# Patient Record
Sex: MALE | Race: BLACK OR AFRICAN AMERICAN | Employment: OTHER | URBAN - METROPOLITAN AREA
[De-identification: names, ages, dates, MRNs, and addresses within clinical notes are randomized per-mention and may not be internally consistent; named-entity substitution may affect disease eponyms.]

---

## 2020-12-12 ENCOUNTER — APPOINTMENT (OUTPATIENT)
Dept: CT IMAGING | Age: 65
DRG: 641 | End: 2020-12-12
Payer: COMMERCIAL

## 2020-12-12 ENCOUNTER — HOSPITAL ENCOUNTER (INPATIENT)
Age: 65
LOS: 1 days | Discharge: HOME | DRG: 641 | End: 2020-12-14
Attending: EMERGENCY MEDICINE | Admitting: INTERNAL MEDICINE
Payer: COMMERCIAL

## 2020-12-12 ENCOUNTER — APPOINTMENT (OUTPATIENT)
Dept: GENERAL RADIOLOGY | Age: 65
DRG: 641 | End: 2020-12-12
Payer: COMMERCIAL

## 2020-12-12 PROBLEM — E86.0 DEHYDRATION: Status: ACTIVE | Noted: 2020-12-12

## 2020-12-12 LAB
ABSOLUTE EOS #: 0 K/UL (ref 0–0.4)
ABSOLUTE IMMATURE GRANULOCYTE: ABNORMAL K/UL (ref 0–0.3)
ABSOLUTE LYMPH #: 0.6 K/UL (ref 1–4.8)
ABSOLUTE MONO #: 1.3 K/UL (ref 0.1–1.2)
ALBUMIN SERPL-MCNC: 4.8 G/DL (ref 3.5–5.2)
ALBUMIN/GLOBULIN RATIO: 1.5 (ref 1–2.5)
ALP BLD-CCNC: 45 U/L (ref 40–129)
ALT SERPL-CCNC: 39 U/L (ref 5–41)
AMYLASE: 33 U/L (ref 28–100)
ANION GAP SERPL CALCULATED.3IONS-SCNC: 29 MMOL/L (ref 9–17)
AST SERPL-CCNC: 99 U/L
BASOPHILS # BLD: 1 % (ref 0–2)
BASOPHILS ABSOLUTE: 0.1 K/UL (ref 0–0.2)
BILIRUB SERPL-MCNC: 0.68 MG/DL (ref 0.3–1.2)
BILIRUBIN DIRECT: 0.28 MG/DL
BILIRUBIN URINE: NEGATIVE
BILIRUBIN, INDIRECT: 0.4 MG/DL (ref 0–1)
BNP INTERPRETATION: ABNORMAL
BUN BLDV-MCNC: 21 MG/DL (ref 8–23)
BUN/CREAT BLD: ABNORMAL (ref 9–20)
CALCIUM SERPL-MCNC: 9.6 MG/DL (ref 8.6–10.4)
CHLORIDE BLD-SCNC: 96 MMOL/L (ref 98–107)
CO2: 19 MMOL/L (ref 20–31)
COLOR: YELLOW
COMMENT UA: ABNORMAL
CREAT SERPL-MCNC: 1.54 MG/DL (ref 0.7–1.2)
D-DIMER QUANTITATIVE: 5.14 MG/L FEU
DIFFERENTIAL TYPE: ABNORMAL
EOSINOPHILS RELATIVE PERCENT: 0 % (ref 1–4)
GFR AFRICAN AMERICAN: 55 ML/MIN
GFR NON-AFRICAN AMERICAN: 46 ML/MIN
GFR SERPL CREATININE-BSD FRML MDRD: ABNORMAL ML/MIN/{1.73_M2}
GFR SERPL CREATININE-BSD FRML MDRD: ABNORMAL ML/MIN/{1.73_M2}
GLOBULIN: ABNORMAL G/DL (ref 1.5–3.8)
GLUCOSE BLD-MCNC: 85 MG/DL (ref 70–99)
GLUCOSE URINE: NEGATIVE
HCT VFR BLD CALC: 37.9 % (ref 41–53)
HEMOGLOBIN: 12.6 G/DL (ref 13.5–17.5)
IMMATURE GRANULOCYTES: ABNORMAL %
INR BLD: 0.9
KETONES, URINE: ABNORMAL
LACTIC ACID, SEPSIS WHOLE BLOOD: ABNORMAL MMOL/L (ref 0.5–1.9)
LACTIC ACID, SEPSIS: 8.5 MMOL/L (ref 0.5–1.9)
LACTIC ACID: 9.2 MMOL/L (ref 0.5–2.2)
LEUKOCYTE ESTERASE, URINE: NEGATIVE
LIPASE: 13 U/L (ref 13–60)
LYMPHOCYTES # BLD: 7 % (ref 24–44)
MCH RBC QN AUTO: 36.7 PG (ref 26–34)
MCHC RBC AUTO-ENTMCNC: 33.4 G/DL (ref 31–37)
MCV RBC AUTO: 109.9 FL (ref 80–100)
MONOCYTES # BLD: 13 % (ref 2–11)
NITRITE, URINE: NEGATIVE
NRBC AUTOMATED: ABNORMAL PER 100 WBC
PARTIAL THROMBOPLASTIN TIME: <20 SEC (ref 21.3–31.3)
PDW BLD-RTO: 14.2 % (ref 12.5–15.4)
PH UA: 5.5 (ref 5–8)
PLATELET # BLD: 248 K/UL (ref 140–450)
PLATELET ESTIMATE: ABNORMAL
PMV BLD AUTO: 7.9 FL (ref 6–12)
POTASSIUM SERPL-SCNC: 4.5 MMOL/L (ref 3.7–5.3)
PRO-BNP: 485 PG/ML
PROTEIN UA: NEGATIVE
PROTHROMBIN TIME: 9.1 SEC (ref 9.4–12.6)
RBC # BLD: 3.45 M/UL (ref 4.5–5.9)
RBC # BLD: ABNORMAL 10*6/UL
SARS-COV-2, RAPID: NOT DETECTED
SARS-COV-2: NORMAL
SARS-COV-2: NORMAL
SEG NEUTROPHILS: 79 % (ref 36–66)
SEGMENTED NEUTROPHILS ABSOLUTE COUNT: 7.5 K/UL (ref 1.8–7.7)
SODIUM BLD-SCNC: 144 MMOL/L (ref 135–144)
SOURCE: NORMAL
SPECIFIC GRAVITY UA: 1.03 (ref 1–1.03)
TOTAL PROTEIN: 8.1 G/DL (ref 6.4–8.3)
TROPONIN INTERP: NORMAL
TROPONIN T: NORMAL NG/ML
TROPONIN, HIGH SENSITIVITY: 22 NG/L (ref 0–22)
TURBIDITY: CLEAR
URINE HGB: NEGATIVE
UROBILINOGEN, URINE: NORMAL
WBC # BLD: 9.5 K/UL (ref 3.5–11)
WBC # BLD: ABNORMAL 10*3/UL

## 2020-12-12 PROCEDURE — 6360000002 HC RX W HCPCS: Performed by: NURSE PRACTITIONER

## 2020-12-12 PROCEDURE — 80076 HEPATIC FUNCTION PANEL: CPT

## 2020-12-12 PROCEDURE — 85025 COMPLETE CBC W/AUTO DIFF WBC: CPT

## 2020-12-12 PROCEDURE — 80048 BASIC METABOLIC PNL TOTAL CA: CPT

## 2020-12-12 PROCEDURE — 2580000003 HC RX 258: Performed by: NURSE PRACTITIONER

## 2020-12-12 PROCEDURE — 81003 URINALYSIS AUTO W/O SCOPE: CPT

## 2020-12-12 PROCEDURE — 93005 ELECTROCARDIOGRAM TRACING: CPT | Performed by: EMERGENCY MEDICINE

## 2020-12-12 PROCEDURE — 99285 EMERGENCY DEPT VISIT HI MDM: CPT

## 2020-12-12 PROCEDURE — 85379 FIBRIN DEGRADATION QUANT: CPT

## 2020-12-12 PROCEDURE — G0378 HOSPITAL OBSERVATION PER HR: HCPCS

## 2020-12-12 PROCEDURE — 83690 ASSAY OF LIPASE: CPT

## 2020-12-12 PROCEDURE — 2580000003 HC RX 258: Performed by: EMERGENCY MEDICINE

## 2020-12-12 PROCEDURE — 82150 ASSAY OF AMYLASE: CPT

## 2020-12-12 PROCEDURE — 96372 THER/PROPH/DIAG INJ SC/IM: CPT

## 2020-12-12 PROCEDURE — 96375 TX/PRO/DX INJ NEW DRUG ADDON: CPT

## 2020-12-12 PROCEDURE — 6360000002 HC RX W HCPCS: Performed by: EMERGENCY MEDICINE

## 2020-12-12 PROCEDURE — 84484 ASSAY OF TROPONIN QUANT: CPT

## 2020-12-12 PROCEDURE — U0003 INFECTIOUS AGENT DETECTION BY NUCLEIC ACID (DNA OR RNA); SEVERE ACUTE RESPIRATORY SYNDROME CORONAVIRUS 2 (SARS-COV-2) (CORONAVIRUS DISEASE [COVID-19]), AMPLIFIED PROBE TECHNIQUE, MAKING USE OF HIGH THROUGHPUT TECHNOLOGIES AS DESCRIBED BY CMS-2020-01-R: HCPCS

## 2020-12-12 PROCEDURE — 96374 THER/PROPH/DIAG INJ IV PUSH: CPT

## 2020-12-12 PROCEDURE — 6370000000 HC RX 637 (ALT 250 FOR IP): Performed by: NURSE PRACTITIONER

## 2020-12-12 PROCEDURE — 85610 PROTHROMBIN TIME: CPT

## 2020-12-12 PROCEDURE — 6360000004 HC RX CONTRAST MEDICATION: Performed by: EMERGENCY MEDICINE

## 2020-12-12 PROCEDURE — 83605 ASSAY OF LACTIC ACID: CPT

## 2020-12-12 PROCEDURE — 83880 ASSAY OF NATRIURETIC PEPTIDE: CPT

## 2020-12-12 PROCEDURE — U0002 COVID-19 LAB TEST NON-CDC: HCPCS

## 2020-12-12 PROCEDURE — 71045 X-RAY EXAM CHEST 1 VIEW: CPT

## 2020-12-12 PROCEDURE — 71260 CT THORAX DX C+: CPT

## 2020-12-12 PROCEDURE — 85730 THROMBOPLASTIN TIME PARTIAL: CPT

## 2020-12-12 PROCEDURE — 96361 HYDRATE IV INFUSION ADD-ON: CPT

## 2020-12-12 PROCEDURE — 36415 COLL VENOUS BLD VENIPUNCTURE: CPT

## 2020-12-12 PROCEDURE — 74176 CT ABD & PELVIS W/O CONTRAST: CPT

## 2020-12-12 RX ORDER — LORAZEPAM 2 MG/ML
1 INJECTION INTRAMUSCULAR
Status: DISCONTINUED | OUTPATIENT
Start: 2020-12-12 | End: 2020-12-14 | Stop reason: HOSPADM

## 2020-12-12 RX ORDER — FOLIC ACID 1 MG/1
1 TABLET ORAL DAILY
Status: DISCONTINUED | OUTPATIENT
Start: 2020-12-12 | End: 2020-12-14 | Stop reason: HOSPADM

## 2020-12-12 RX ORDER — LORAZEPAM 1 MG/1
4 TABLET ORAL
Status: DISCONTINUED | OUTPATIENT
Start: 2020-12-12 | End: 2020-12-14 | Stop reason: HOSPADM

## 2020-12-12 RX ORDER — SODIUM CHLORIDE 0.9 % (FLUSH) 0.9 %
10 SYRINGE (ML) INJECTION PRN
Status: DISCONTINUED | OUTPATIENT
Start: 2020-12-12 | End: 2020-12-14 | Stop reason: HOSPADM

## 2020-12-12 RX ORDER — SODIUM CHLORIDE, SODIUM LACTATE, POTASSIUM CHLORIDE, AND CALCIUM CHLORIDE .6; .31; .03; .02 G/100ML; G/100ML; G/100ML; G/100ML
1000 INJECTION, SOLUTION INTRAVENOUS ONCE
Status: COMPLETED | OUTPATIENT
Start: 2020-12-12 | End: 2020-12-12

## 2020-12-12 RX ORDER — NICOTINE 21 MG/24HR
1 PATCH, TRANSDERMAL 24 HOURS TRANSDERMAL DAILY PRN
Status: DISCONTINUED | OUTPATIENT
Start: 2020-12-12 | End: 2020-12-14 | Stop reason: HOSPADM

## 2020-12-12 RX ORDER — ASPIRIN 81 MG/1
81 TABLET, CHEWABLE ORAL DAILY
Status: DISCONTINUED | OUTPATIENT
Start: 2020-12-12 | End: 2020-12-14 | Stop reason: HOSPADM

## 2020-12-12 RX ORDER — SODIUM CHLORIDE 0.9 % (FLUSH) 0.9 %
10 SYRINGE (ML) INJECTION EVERY 12 HOURS SCHEDULED
Status: DISCONTINUED | OUTPATIENT
Start: 2020-12-12 | End: 2020-12-14 | Stop reason: HOSPADM

## 2020-12-12 RX ORDER — POTASSIUM CHLORIDE 7.45 MG/ML
10 INJECTION INTRAVENOUS PRN
Status: DISCONTINUED | OUTPATIENT
Start: 2020-12-12 | End: 2020-12-14 | Stop reason: HOSPADM

## 2020-12-12 RX ORDER — PROMETHAZINE HYDROCHLORIDE 25 MG/1
12.5 TABLET ORAL EVERY 6 HOURS PRN
Status: DISCONTINUED | OUTPATIENT
Start: 2020-12-12 | End: 2020-12-14 | Stop reason: HOSPADM

## 2020-12-12 RX ORDER — 0.9 % SODIUM CHLORIDE 0.9 %
1000 INTRAVENOUS SOLUTION INTRAVENOUS ONCE
Status: COMPLETED | OUTPATIENT
Start: 2020-12-12 | End: 2020-12-12

## 2020-12-12 RX ORDER — 0.9 % SODIUM CHLORIDE 0.9 %
80 INTRAVENOUS SOLUTION INTRAVENOUS ONCE
Status: COMPLETED | OUTPATIENT
Start: 2020-12-12 | End: 2020-12-12

## 2020-12-12 RX ORDER — ONDANSETRON 2 MG/ML
4 INJECTION INTRAMUSCULAR; INTRAVENOUS ONCE
Status: COMPLETED | OUTPATIENT
Start: 2020-12-12 | End: 2020-12-12

## 2020-12-12 RX ORDER — LORAZEPAM 1 MG/1
1 TABLET ORAL
Status: DISCONTINUED | OUTPATIENT
Start: 2020-12-12 | End: 2020-12-14 | Stop reason: HOSPADM

## 2020-12-12 RX ORDER — ONDANSETRON 2 MG/ML
4 INJECTION INTRAMUSCULAR; INTRAVENOUS EVERY 6 HOURS PRN
Status: DISCONTINUED | OUTPATIENT
Start: 2020-12-12 | End: 2020-12-14 | Stop reason: HOSPADM

## 2020-12-12 RX ORDER — POTASSIUM CHLORIDE 20 MEQ/1
40 TABLET, EXTENDED RELEASE ORAL PRN
Status: DISCONTINUED | OUTPATIENT
Start: 2020-12-12 | End: 2020-12-14 | Stop reason: HOSPADM

## 2020-12-12 RX ORDER — HEPARIN SODIUM 5000 [USP'U]/ML
5000 INJECTION, SOLUTION INTRAVENOUS; SUBCUTANEOUS EVERY 8 HOURS SCHEDULED
Status: DISCONTINUED | OUTPATIENT
Start: 2020-12-12 | End: 2020-12-14 | Stop reason: HOSPADM

## 2020-12-12 RX ORDER — LORAZEPAM 1 MG/1
2 TABLET ORAL
Status: DISCONTINUED | OUTPATIENT
Start: 2020-12-12 | End: 2020-12-14 | Stop reason: HOSPADM

## 2020-12-12 RX ORDER — LORAZEPAM 2 MG/ML
3 INJECTION INTRAMUSCULAR
Status: DISCONTINUED | OUTPATIENT
Start: 2020-12-12 | End: 2020-12-14 | Stop reason: HOSPADM

## 2020-12-12 RX ORDER — ASPIRIN 81 MG/1
81 TABLET, CHEWABLE ORAL DAILY
Status: ON HOLD | COMMUNITY
End: 2020-12-14 | Stop reason: HOSPADM

## 2020-12-12 RX ORDER — MAGNESIUM SULFATE 1 G/100ML
1 INJECTION INTRAVENOUS PRN
Status: DISCONTINUED | OUTPATIENT
Start: 2020-12-12 | End: 2020-12-14 | Stop reason: HOSPADM

## 2020-12-12 RX ORDER — POLYETHYLENE GLYCOL 3350 17 G/17G
17 POWDER, FOR SOLUTION ORAL DAILY PRN
Status: DISCONTINUED | OUTPATIENT
Start: 2020-12-12 | End: 2020-12-13

## 2020-12-12 RX ORDER — ACETAMINOPHEN 650 MG/1
650 SUPPOSITORY RECTAL EVERY 6 HOURS PRN
Status: DISCONTINUED | OUTPATIENT
Start: 2020-12-12 | End: 2020-12-14 | Stop reason: HOSPADM

## 2020-12-12 RX ORDER — OMEPRAZOLE 40 MG/1
40 CAPSULE, DELAYED RELEASE ORAL DAILY
COMMUNITY

## 2020-12-12 RX ORDER — LORAZEPAM 1 MG/1
3 TABLET ORAL
Status: DISCONTINUED | OUTPATIENT
Start: 2020-12-12 | End: 2020-12-14 | Stop reason: HOSPADM

## 2020-12-12 RX ORDER — ACETAMINOPHEN 325 MG/1
650 TABLET ORAL EVERY 6 HOURS PRN
Status: DISCONTINUED | OUTPATIENT
Start: 2020-12-12 | End: 2020-12-14 | Stop reason: HOSPADM

## 2020-12-12 RX ORDER — PANTOPRAZOLE SODIUM 40 MG/1
40 TABLET, DELAYED RELEASE ORAL
Status: DISCONTINUED | OUTPATIENT
Start: 2020-12-13 | End: 2020-12-13

## 2020-12-12 RX ORDER — SODIUM CHLORIDE 0.9 % (FLUSH) 0.9 %
10 SYRINGE (ML) INJECTION PRN
Status: DISCONTINUED | OUTPATIENT
Start: 2020-12-12 | End: 2020-12-12

## 2020-12-12 RX ORDER — SODIUM CHLORIDE 9 MG/ML
INJECTION, SOLUTION INTRAVENOUS CONTINUOUS
Status: DISCONTINUED | OUTPATIENT
Start: 2020-12-12 | End: 2020-12-14

## 2020-12-12 RX ORDER — LORAZEPAM 2 MG/ML
4 INJECTION INTRAMUSCULAR
Status: DISCONTINUED | OUTPATIENT
Start: 2020-12-12 | End: 2020-12-14 | Stop reason: HOSPADM

## 2020-12-12 RX ORDER — THIAMINE MONONITRATE (VIT B1) 100 MG
100 TABLET ORAL DAILY
Status: DISCONTINUED | OUTPATIENT
Start: 2020-12-12 | End: 2020-12-14 | Stop reason: HOSPADM

## 2020-12-12 RX ORDER — LORAZEPAM 2 MG/ML
2 INJECTION INTRAMUSCULAR
Status: DISCONTINUED | OUTPATIENT
Start: 2020-12-12 | End: 2020-12-14 | Stop reason: HOSPADM

## 2020-12-12 RX ADMIN — HEPARIN SODIUM 5000 UNITS: 5000 INJECTION INTRAVENOUS; SUBCUTANEOUS at 21:11

## 2020-12-12 RX ADMIN — SODIUM CHLORIDE 80 ML: 9 INJECTION, SOLUTION INTRAVENOUS at 16:21

## 2020-12-12 RX ADMIN — SODIUM CHLORIDE 1000 ML: 9 INJECTION, SOLUTION INTRAVENOUS at 14:41

## 2020-12-12 RX ADMIN — Medication 100 MG: at 21:02

## 2020-12-12 RX ADMIN — Medication 10 ML: at 16:20

## 2020-12-12 RX ADMIN — FOLIC ACID 1 MG: 1 TABLET ORAL at 21:02

## 2020-12-12 RX ADMIN — SODIUM CHLORIDE, POTASSIUM CHLORIDE, SODIUM LACTATE AND CALCIUM CHLORIDE 1000 ML: 600; 310; 30; 20 INJECTION, SOLUTION INTRAVENOUS at 16:12

## 2020-12-12 RX ADMIN — ASPIRIN 81 MG: 81 TABLET, CHEWABLE ORAL at 21:05

## 2020-12-12 RX ADMIN — ONDANSETRON 4 MG: 2 INJECTION INTRAMUSCULAR; INTRAVENOUS at 16:11

## 2020-12-12 RX ADMIN — SODIUM CHLORIDE: 9 INJECTION, SOLUTION INTRAVENOUS at 18:45

## 2020-12-12 RX ADMIN — SODIUM CHLORIDE, POTASSIUM CHLORIDE, SODIUM LACTATE AND CALCIUM CHLORIDE 1000 ML: 600; 310; 30; 20 INJECTION, SOLUTION INTRAVENOUS at 15:46

## 2020-12-12 RX ADMIN — IOPAMIDOL 75 ML: 755 INJECTION, SOLUTION INTRAVENOUS at 16:20

## 2020-12-12 NOTE — ED NOTES
Lab in room to collect repeat lactic acid. Attempt to call report.  Room assignment given, RN unavailable for report will call back when ready     Tamar Lara RN  12/12/20 0223

## 2020-12-12 NOTE — ED PROVIDER NOTES
OMEPRAZOLE (PRILOSEC) 40 MG DELAYED RELEASE CAPSULE    Take 40 mg by mouth daily       ALLERGIES     has No Known Allergies. FAMILY HISTORY     has no family status information on file. family history is not on file. SOCIAL HISTORY      reports that he has never smoked. He has never used smokeless tobacco. He reports current alcohol use. He reports that he does not use drugs. PHYSICAL EXAM     INITIAL VITALS:  height is 5' 10.5\" (1.791 m) and weight is 69.4 kg (153 lb). His oral temperature is 98.2 °F (36.8 °C). His blood pressure is 149/68 (abnormal) and his pulse is 91. His respiration is 18 and oxygen saturation is 97%. The patient is alert and oriented, in no apparent distress. HEENT is atraumatic. Pupils are PERRL at 4 mm with normal extraocular motion. Mucous membranes moist.    Neck is supple with no lymphadenopathy. No JVD. No meningismus. Heart sounds irregular rate and rhythm with no gallops, murmurs, or rubs. Lungs clear, no wheezes, rales or rhonchi. Abdomen: soft, with no pain to palpation. .  No pulsatile mass. Normal bowel sounds are noted. No rebound or guarding. Musculoskeletal exam shows no evidence of trauma. Normal distal pulses in all extremities. Skin: no rash or edema. Neurological exam reveals cranial nerves 2 through 12 grossly intact. Patient has equal  and normal deep tendon reflexes. Psychiatric: appropriate. Lymphatics.:  No lymphadenopathy. DIFFERENTIAL DIAGNOSIS/ MDM:     Vomiting, gastritis, pancreatitis, arrhythmia, AMI, ACS    DIAGNOSTIC RESULTS     EKG: All EKG's are interpreted by the Emergency Department Physician who either signs or Co-signs this chart in the absence of a cardiologist.    As tach 119 with frequent couplets of PVCs. No old to compare. Nonspecific ST change. Axis -55, , QRS 64, .     RADIOLOGY:   I reviewed the radiologist interpretations:  CT CHEST PULMONARY EMBOLISM W CONTRAST Final Result   2 small bilateral pulmonary nodules as above. This is not typical for   COVID-19. This does not exclude a COVID infection. RECOMMENDATIONS:   Multiple pulmonary nodules. Most severe: 4.0 mm ground glass pulmonary   nodule. Recommend a non-contrast Chest CT at 3-6 months. If stable, consider   follow-up non-contrast Chest CTs at 2 and 4 years. These guidelines do not apply to immunocompromised patients and patients with   cancer. Follow up in patients with significant comorbidities as clinically   warranted. For lung cancer screening, adhere to Lung-RADS guidelines. Reference: Radiology. 2017; 284(1):228-43. XR CHEST PORTABLE   Final Result   No acute process. CT ABDOMEN PELVIS WO CONTRAST Additional Contrast? None   Final Result   Fatty liver. Small hiatal hernia. CT CHEST PULMONARY EMBOLISM W CONTRAST (Final result)  Result time 12/12/20 16:40:38  Final result by Adelia Collins MD (12/12/20 16:40:38)                Impression:    2 small bilateral pulmonary nodules as above.  This is not typical for   COVID-19.  This does not exclude a COVID infection. RECOMMENDATIONS:   Multiple pulmonary nodules. Most severe: 4.0 mm ground glass pulmonary   nodule. Recommend a non-contrast Chest CT at 3-6 months. If stable, consider   follow-up non-contrast Chest CTs at 2 and 4 years. These guidelines do not apply to immunocompromised patients and patients with   cancer. Follow up in patients with significant comorbidities as clinically   warranted. For lung cancer screening, adhere to Lung-RADS guidelines. Reference: Radiology. 2017; 284(1):228-43. Narrative:    EXAMINATION:   CTA OF THE CHEST 12/12/2020 4:10 pm     TECHNIQUE:   CTA of the chest was performed after the administration of intravenous   contrast.  Multiplanar reformatted images are provided for review.   MIP images are provided for review. Dose modulation, iterative reconstruction,   and/or weight based adjustment of the mA/kV was utilized to reduce the   radiation dose to as low as reasonably achievable. COMPARISON:   Chest x-ray from today     HISTORY:   ORDERING SYSTEM PROVIDED HISTORY: Chest Pain   TECHNOLOGIST PROVIDED HISTORY:   Chest Pain   Reason for Exam: heartburn vomiting looking for covid   Acuity: Acute   Type of Exam: Initial     FINDINGS:   Pulmonary Arteries: Pulmonary arteries are adequately opacified for   evaluation.  No evidence of intraluminal filling defect to suggest pulmonary   embolism.  Main pulmonary artery is normal in caliber. Mediastinum: No evidence of mediastinal lymphadenopathy.  The heart and   pericardium demonstrate no acute abnormality.  There is no acute abnormality   of the thoracic aorta. Lungs/pleura: 4 mm ground-glass nodule left lower lobe image number 70.  3 mm   ground-glass nodule right lower lobe image number 90. Upper Abdomen: Limited images of the upper abdomen are unremarkable. Soft Tissues/Bones: Mild dextroconvex scoliosis.         XR CHEST PORTABLE (Final result)  Result time 12/12/20 15:51:17  Final result by Imani Pepper MD (12/12/20 15:51:17)                Impression:    No acute process. Narrative:    EXAMINATION:   ONE XRAY VIEW OF THE CHEST     12/12/2020 3:41 pm     COMPARISON:   January 23, 2013     HISTORY:   ORDERING SYSTEM PROVIDED HISTORY: vomiting   TECHNOLOGIST PROVIDED HISTORY:   vomiting   Reason for Exam: heartburn vomitrng   Acuity: Acute   Type of Exam: Initial     FINDINGS:   The lungs are without acute focal process.  There is no effusion or   pneumothorax. The cardiomediastinal silhouette is without acute process.  The   osseous structures are without acute process.                     CT ABDOMEN PELVIS WO CONTRAST Additional Contrast? None (Final result)  Result time 12/12/20 15:53:04 Procedure changed from [facility]  Final result by Chantel Orr MD (12/12/20 15:53:04)                Impression:    Fatty liver. Small hiatal hernia. Narrative:    EXAMINATION:   CT OF THE ABDOMEN AND PELVIS WITHOUT CONTRAST 12/12/2020 3:30 pm     TECHNIQUE:   CT of the abdomen and pelvis was performed without the administration of   intravenous contrast. Multiplanar reformatted images are provided for review. Dose modulation, iterative reconstruction, and/or weight based adjustment of   the mA/kV was utilized to reduce the radiation dose to as low as reasonably   achievable. COMPARISON:   None. HISTORY:   ORDERING SYSTEM PROVIDED HISTORY: Pain   TECHNOLOGIST PROVIDED HISTORY:   IV Only Contrast   Pain     Reason for Exam: mid abd pain heartburn vomiting   Acuity: Acute   Type of Exam: Initial     FINDINGS:   Lower Chest: Unremarkable     Organs: Fatty liver.  Gallbladder, pancreas, and spleen are unremarkable. GI/Bowel: Small hiatal hernia.  Appendix is normal.  No findings to suggest   bowel obstruction.  Scattered colonic diverticula.  No acute diverticulitis. Pelvis: No bladder stone.  No pelvic fluid collection.  No adenopathy. Peritoneum/Retroperitoneum: No abdominal aortic aneurysm.  Adrenal glands   unremarkable. Paulo Fillers are unremarkable.  No renal or ureteral stone.      Bones/Soft Tissues: No acute bony abnormality.                     LABS:  Results for orders placed or performed during the hospital encounter of 12/12/20   CBC Auto Differential   Result Value Ref Range    WBC 9.5 3.5 - 11.0 k/uL    RBC 3.45 (L) 4.5 - 5.9 m/uL    Hemoglobin 12.6 (L) 13.5 - 17.5 g/dL    Hematocrit 37.9 (L) 41 - 53 %    .9 (H) 80 - 100 fL    MCH 36.7 (H) 26 - 34 pg    MCHC 33.4 31 - 37 g/dL    RDW 14.2 12.5 - 15.4 %    Platelets 729 104 - 927 k/uL    MPV 7.9 6.0 - 12.0 fL    NRBC Automated NOT REPORTED per 100 WBC    Differential Type NOT REPORTED Seg Neutrophils 79 (H) 36 - 66 %    Lymphocytes 7 (L) 24 - 44 %    Monocytes 13 (H) 2 - 11 %    Eosinophils % 0 (L) 1 - 4 %    Basophils 1 0 - 2 %    Immature Granulocytes NOT REPORTED 0 %    Segs Absolute 7.50 1.8 - 7.7 k/uL    Absolute Lymph # 0.60 (L) 1.0 - 4.8 k/uL    Absolute Mono # 1.30 (H) 0.1 - 1.2 k/uL    Absolute Eos # 0.00 0.0 - 0.4 k/uL    Basophils Absolute 0.10 0.0 - 0.2 k/uL    Absolute Immature Granulocyte NOT REPORTED 0.00 - 0.30 k/uL    WBC Morphology NOT REPORTED     RBC Morphology NOT REPORTED     Platelet Estimate NOT REPORTED    Basic Metabolic Panel   Result Value Ref Range    Glucose 85 70 - 99 mg/dL    BUN 21 8 - 23 mg/dL    CREATININE 1.54 (H) 0.70 - 1.20 mg/dL    Bun/Cre Ratio NOT REPORTED 9 - 20    Calcium 9.6 8.6 - 10.4 mg/dL    Sodium 144 135 - 144 mmol/L    Potassium 4.5 3.7 - 5.3 mmol/L    Chloride 96 (L) 98 - 107 mmol/L    CO2 19 (L) 20 - 31 mmol/L    Anion Gap 29 (H) 9 - 17 mmol/L    GFR Non-African American 46 (L) >60 mL/min    GFR  55 (L) >60 mL/min    GFR Comment          GFR Staging NOT REPORTED    Brain Natriuretic Peptide   Result Value Ref Range    Pro- (H) <300 pg/mL    BNP Interpretation Pro-BNP Reference Range:    Troponin   Result Value Ref Range    Troponin, High Sensitivity 22 0 - 22 ng/L    Troponin T NOT REPORTED <0.03 ng/mL    Troponin Interp NOT REPORTED    D-Dimer, Quantitative   Result Value Ref Range    D-Dimer, Quant 5.14 mg/L FEU   Protime-INR   Result Value Ref Range    Protime 9.1 (L) 9.4 - 12.6 sec    INR 0.9    APTT   Result Value Ref Range    PTT <20.0 (L) 21.3 - 31.3 sec   Hepatic Function Panel   Result Value Ref Range    Alb 4.8 3.5 - 5.2 g/dL    Alkaline Phosphatase 45 40 - 129 U/L    ALT 39 5 - 41 U/L    AST 99 (H) <40 U/L    Total Bilirubin 0.68 0.3 - 1.2 mg/dL    Bilirubin, Direct 0.28 <0.31 mg/dL    Bilirubin, Indirect 0.40 0.00 - 1.00 mg/dL    Total Protein 8.1 6.4 - 8.3 g/dL    Globulin NOT REPORTED 1.5 - 3.8 g/dL The patient had vomiting and appears to be very dehydrated based upon his labs. I have started fluid resuscitation. I have discussed the patient CT scans and lab results with him. We will be admitting him for further hydration. The patient is admitted in stable condition. CRITICAL CARE:     Critical Care: The high probability of sudden, clinically significant deterioration in the patient's condition required the highest level of my preparedness to intervene urgently. ? The services I provided to this patient were to treat and/or prevent clinically significant deterioration. Services included the following: chart data review, reviewing nursing notes and/or old charts, documentation time, consultant collaboration regarding findings and treatment options, medication orders and management, direct patient care, vital sign assessments and ordering, interpreting and reviewing diagnostic studies/lab tests. ?  Aggregate critical care time includes only time during which I was engaged in work directly related to the patient's care, as described above, whether at the bedside or elsewhere in the Emergency Department. It did not include time spent performing other reported procedures or the services of residents, students, nurses, nurse practitioners or physician assistants. ?  Critical Care: 60 minutes. Management of dehydration, elevated lactate in light of nausea/vomiting and abdominal pain. CONSULTS:    1960  Discussed with [facility] for hospitalist group. Will admit. FINAL IMPRESSION      1. Non-intractable vomiting with nausea, unspecified vomiting type    2. Elevated lactic acid level          DISPOSITION/PLAN   DISPOSITION        Condition on Disposition    stable    PATIENT REFERRED TO:  No follow-up provider specified.     DISCHARGE MEDICATIONS:  New Prescriptions    No medications on file (Please note that portions of this note were completed with a voice recognition program.  Efforts were made to edit the dictations but occasionally words are mis-transcribed.)    Murphy MD   Attending Emergency Physician         Tanner Rocha MD  12/12/20 [id number]

## 2020-12-13 PROBLEM — R11.2 NON-INTRACTABLE VOMITING WITH NAUSEA: Status: ACTIVE | Noted: 2020-12-13

## 2020-12-13 PROBLEM — K44.9 HIATAL HERNIA: Status: ACTIVE | Noted: 2020-12-13

## 2020-12-13 PROBLEM — K76.0 FATTY LIVER: Status: ACTIVE | Noted: 2020-12-13

## 2020-12-13 PROBLEM — N40.1 BENIGN PROSTATIC HYPERPLASIA WITH LOWER URINARY TRACT SYMPTOMS: Status: ACTIVE | Noted: 2018-11-21

## 2020-12-13 PROBLEM — R91.8 PULMONARY NODULES/LESIONS, MULTIPLE: Status: ACTIVE | Noted: 2020-12-13

## 2020-12-13 PROBLEM — N17.9 AKI (ACUTE KIDNEY INJURY) (HCC): Status: ACTIVE | Noted: 2020-12-13

## 2020-12-13 PROBLEM — R79.89 ELEVATED LACTIC ACID LEVEL: Status: ACTIVE | Noted: 2020-12-13

## 2020-12-13 PROBLEM — D64.9 ANEMIA: Status: ACTIVE | Noted: 2020-12-13

## 2020-12-13 PROBLEM — I70.0 ATHEROSCLEROSIS OF AORTA (HCC): Status: ACTIVE | Noted: 2018-06-05

## 2020-12-13 PROBLEM — R11.10 NON-INTRACTABLE VOMITING: Status: ACTIVE | Noted: 2020-12-13

## 2020-12-13 LAB
ANION GAP SERPL CALCULATED.3IONS-SCNC: 7 MMOL/L (ref 9–17)
BUN BLDV-MCNC: 16 MG/DL (ref 8–23)
BUN/CREAT BLD: ABNORMAL (ref 9–20)
CALCIUM SERPL-MCNC: 8.2 MG/DL (ref 8.6–10.4)
CHLORIDE BLD-SCNC: 100 MMOL/L (ref 98–107)
CO2: 30 MMOL/L (ref 20–31)
CREAT SERPL-MCNC: 1.37 MG/DL (ref 0.7–1.2)
FOLATE: 18.3 NG/ML
GFR AFRICAN AMERICAN: >60 ML/MIN
GFR NON-AFRICAN AMERICAN: 52 ML/MIN
GFR SERPL CREATININE-BSD FRML MDRD: ABNORMAL ML/MIN/{1.73_M2}
GFR SERPL CREATININE-BSD FRML MDRD: ABNORMAL ML/MIN/{1.73_M2}
GLUCOSE BLD-MCNC: 121 MG/DL (ref 70–99)
HCT VFR BLD CALC: 31.2 % (ref 41–53)
HEMOGLOBIN: 10.6 G/DL (ref 13.5–17.5)
INR BLD: 0.9
LACTIC ACID: 1.4 MMOL/L (ref 0.5–2.2)
MAGNESIUM: 1.5 MG/DL (ref 1.6–2.6)
MCH RBC QN AUTO: 36.5 PG (ref 26–34)
MCHC RBC AUTO-ENTMCNC: 34 G/DL (ref 31–37)
MCV RBC AUTO: 107.4 FL (ref 80–100)
NRBC AUTOMATED: ABNORMAL PER 100 WBC
PDW BLD-RTO: 14.2 % (ref 12.5–15.4)
PLATELET # BLD: 154 K/UL (ref 140–450)
PMV BLD AUTO: 7.8 FL (ref 6–12)
POTASSIUM SERPL-SCNC: 3.8 MMOL/L (ref 3.7–5.3)
PROTHROMBIN TIME: 9.9 SEC (ref 9.4–12.6)
RBC # BLD: 2.91 M/UL (ref 4.5–5.9)
SODIUM BLD-SCNC: 137 MMOL/L (ref 135–144)
VITAMIN B-12: 929 PG/ML (ref 232–1245)
WBC # BLD: 4.7 K/UL (ref 3.5–11)

## 2020-12-13 PROCEDURE — 83735 ASSAY OF MAGNESIUM: CPT

## 2020-12-13 PROCEDURE — 2580000003 HC RX 258: Performed by: NURSE PRACTITIONER

## 2020-12-13 PROCEDURE — 96366 THER/PROPH/DIAG IV INF ADDON: CPT

## 2020-12-13 PROCEDURE — APPSS45 APP SPLIT SHARED TIME 31-45 MINUTES: Performed by: NURSE PRACTITIONER

## 2020-12-13 PROCEDURE — 51798 US URINE CAPACITY MEASURE: CPT

## 2020-12-13 PROCEDURE — 2060000000 HC ICU INTERMEDIATE R&B

## 2020-12-13 PROCEDURE — 36415 COLL VENOUS BLD VENIPUNCTURE: CPT

## 2020-12-13 PROCEDURE — 82607 VITAMIN B-12: CPT

## 2020-12-13 PROCEDURE — 83036 HEMOGLOBIN GLYCOSYLATED A1C: CPT

## 2020-12-13 PROCEDURE — 80048 BASIC METABOLIC PNL TOTAL CA: CPT

## 2020-12-13 PROCEDURE — 99222 1ST HOSP IP/OBS MODERATE 55: CPT | Performed by: INTERNAL MEDICINE

## 2020-12-13 PROCEDURE — 6360000002 HC RX W HCPCS: Performed by: NURSE PRACTITIONER

## 2020-12-13 PROCEDURE — 6370000000 HC RX 637 (ALT 250 FOR IP): Performed by: NURSE PRACTITIONER

## 2020-12-13 PROCEDURE — G0378 HOSPITAL OBSERVATION PER HR: HCPCS

## 2020-12-13 PROCEDURE — 85027 COMPLETE CBC AUTOMATED: CPT

## 2020-12-13 PROCEDURE — 96365 THER/PROPH/DIAG IV INF INIT: CPT

## 2020-12-13 PROCEDURE — 83605 ASSAY OF LACTIC ACID: CPT

## 2020-12-13 PROCEDURE — 82746 ASSAY OF FOLIC ACID SERUM: CPT

## 2020-12-13 PROCEDURE — 85610 PROTHROMBIN TIME: CPT

## 2020-12-13 PROCEDURE — 96372 THER/PROPH/DIAG INJ SC/IM: CPT

## 2020-12-13 RX ORDER — PANTOPRAZOLE SODIUM 40 MG/1
40 TABLET, DELAYED RELEASE ORAL
Status: DISCONTINUED | OUTPATIENT
Start: 2020-12-13 | End: 2020-12-14 | Stop reason: HOSPADM

## 2020-12-13 RX ORDER — CALCIUM CARBONATE 200(500)MG
500 TABLET,CHEWABLE ORAL 3 TIMES DAILY PRN
Status: DISCONTINUED | OUTPATIENT
Start: 2020-12-13 | End: 2020-12-14 | Stop reason: HOSPADM

## 2020-12-13 RX ORDER — SUCRALFATE 1 G/1
1 TABLET ORAL EVERY 6 HOURS SCHEDULED
Status: DISCONTINUED | OUTPATIENT
Start: 2020-12-13 | End: 2020-12-14 | Stop reason: HOSPADM

## 2020-12-13 RX ORDER — POLYETHYLENE GLYCOL 3350 17 G/17G
17 POWDER, FOR SOLUTION ORAL 2 TIMES DAILY
Status: DISCONTINUED | OUTPATIENT
Start: 2020-12-13 | End: 2020-12-14

## 2020-12-13 RX ADMIN — SODIUM CHLORIDE: 9 INJECTION, SOLUTION INTRAVENOUS at 14:11

## 2020-12-13 RX ADMIN — MAGNESIUM SULFATE HEPTAHYDRATE 1 G: 1 INJECTION, SOLUTION INTRAVENOUS at 23:36

## 2020-12-13 RX ADMIN — SUCRALFATE 1 G: 1 TABLET ORAL at 17:10

## 2020-12-13 RX ADMIN — SUCRALFATE 1 G: 1 TABLET ORAL at 23:37

## 2020-12-13 RX ADMIN — Medication 100 MG: at 08:44

## 2020-12-13 RX ADMIN — HEPARIN SODIUM 5000 UNITS: 5000 INJECTION INTRAVENOUS; SUBCUTANEOUS at 06:06

## 2020-12-13 RX ADMIN — POLYETHYLENE GLYCOL 3350 17 G: 17 POWDER, FOR SOLUTION ORAL at 20:53

## 2020-12-13 RX ADMIN — ASPIRIN 81 MG: 81 TABLET, CHEWABLE ORAL at 08:44

## 2020-12-13 RX ADMIN — PANTOPRAZOLE SODIUM 40 MG: 40 TABLET, DELAYED RELEASE ORAL at 17:40

## 2020-12-13 RX ADMIN — PROMETHAZINE HYDROCHLORIDE 12.5 MG: 25 TABLET ORAL at 17:13

## 2020-12-13 RX ADMIN — MAGNESIUM SULFATE HEPTAHYDRATE 1 G: 1 INJECTION, SOLUTION INTRAVENOUS at 22:27

## 2020-12-13 RX ADMIN — HEPARIN SODIUM 5000 UNITS: 5000 INJECTION INTRAVENOUS; SUBCUTANEOUS at 14:08

## 2020-12-13 RX ADMIN — HEPARIN SODIUM 5000 UNITS: 5000 INJECTION INTRAVENOUS; SUBCUTANEOUS at 20:53

## 2020-12-13 RX ADMIN — FOLIC ACID 1 MG: 1 TABLET ORAL at 08:44

## 2020-12-13 RX ADMIN — PANTOPRAZOLE SODIUM 40 MG: 40 TABLET, DELAYED RELEASE ORAL at 06:06

## 2020-12-13 RX ADMIN — SUCRALFATE 1 G: 1 TABLET ORAL at 13:00

## 2020-12-13 RX ADMIN — CALCIUM CARBONATE (ANTACID) CHEW TAB 500 MG 500 MG: 500 CHEW TAB at 06:06

## 2020-12-13 SDOH — HEALTH STABILITY: MENTAL HEALTH: HOW OFTEN DO YOU HAVE A DRINK CONTAINING ALCOHOL?: 4 OR MORE TIMES A WEEK

## 2020-12-13 SDOH — HEALTH STABILITY: MENTAL HEALTH: HOW MANY STANDARD DRINKS CONTAINING ALCOHOL DO YOU HAVE ON A TYPICAL DAY?: 3 OR 4

## 2020-12-13 ASSESSMENT — ENCOUNTER SYMPTOMS
RESPIRATORY NEGATIVE: 1
NAUSEA: 1
EYES NEGATIVE: 1
CONSTIPATION: 1
ABDOMINAL PAIN: 1
VOMITING: 1
DIARRHEA: 1

## 2020-12-13 ASSESSMENT — PAIN SCALES - GENERAL: PAINLEVEL_OUTOF10: 0

## 2020-12-13 NOTE — PLAN OF CARE
Siderails up x 2  Hourly rounding  Call light in reach  Instructed to call for assist before attempting out of bed. Remains free from falls and accidental injury at this time   Floor free from obstacles  Bed is locked and in lowest position  Adequate lighting provided  Bed alarm on, [place] star and Stay with Me signs posted     Pain level assessment complete.    Patient educated on pain scale and control interventions  PRN pain medication given per patient request  Patient instructed to call out with new onset of pain or unrelieved pain     Nausea/vomiting has resolved   Patient is tolerating food and liquids

## 2020-12-13 NOTE — H&P
[facility]  Office: [address], DO, Danitzajolie Quijano, DO, Davian Whaley, DO, Itzel Youssef, DO, Valiant Sicard, MD, Jayne Blair MD, Rojelio Tierney MD, Cali Koo MD, Lakesha Hicks MD, Jarrell Golden MD, Rodolfo Deal MD, Wyatt Lopez MD, Hemant Spann MD, Ananda Pedro, DO, Michael Yao MD, Jimy Roberson MD, Pam Ko DO, Olga Smith MD,  Steve Nolasco, DO, Adelaide Seth MD, Sydni Mccarthy MD, Juan R Rmoo, [facility], [place], [facility], Taylor Garay, [facility], Maycol Wagoner, CNS, Jasmyne Melton, CNP, Dahlia Guzman, CNP, Sharon Thompson, CNP, Beth Lacey, CNP, Tristin Bird, CNP, Tressa Hernandes PA-C, Steve Fontaine, [facility], Jason Evans, CNP, Rolo Fernandez, CNP, Angi Angel, CNP, Zuleika Bello, CNP, Jourdan Aguiar, [facility]   [address] [facility]    HISTORY AND PHYSICAL EXAMINATION            Date:   12/13/2020  Patient name:  Asha Truong  Date of admission:  12/12/2020  2:14 PM  MRN:   [id number]  Account:  [de-identified]  YOB: 1955  PCP:    Celestina Gama MD  Room:   [address] [place] [address]  Code Status:    Full Code    Chief Complaint:     Chief Complaint   Patient presents with    Emesis     onset at 0300 today, pt states unable to keep anything down    Tachycardia       History Obtained From:     patient, electronic medical record    History of Present Illness:     Asha Truong is a 60 y.o. Non-/non  male who presents with Emesis (onset at 0300 today, pt states unable to keep anything down) and Tachycardia   and is admitted to the hospital for the management of Non-intractable vomiting with nausea. Patient reports his vomiting started at 3 AM yesterday morning. Also had complaints of fast heartbeat. He has had episodes of vomiting after eating for the last 2 years. In 2018 he had an EGD with Dr Rosendo Guaman which revealed congestive gastropathy. Biopsy of the gastric antrum revealed regenerative changes of foveolar epithelium. Patient also has had a colonoscopy with biopsy that revealed a tubular adenoma. Patient has a documented family history of colon cancer. Patient also complains of frequent hiccups and complains of burning from his throat to his stomach. He admits to consuming 2 to 3 glasses of cardiac or christopher daily. He also reports he has a history of irregular heartbeat. He says he does  feel irregular heartbeats from time to time. While in the ED, twelve-lead EKG revealed sinus tach with heart rate of 118 with frequent couplets. CT of the chest PE with contrast was completed and revealed multiple pulmonary nodules. CT of the abdomen pelvis was completed and revealed fatty liver. Gallbladder, pancreas, and spleen were unremarkable. A small hiatal hernia seen. No findings suspicious for bowel obstruction along with scattered colonic diverticula. No acute diverticulitis seen. Creatinine slightly elevated at 1.54, proBNP 485, troponin 22, lactic acid 9.2. He was given IV fluid boluses and admitted to the inpatient nursing unit for non-intractable nausea and vomiting and dehydration. Past Medical History:     Past Medical History:   Diagnosis Date    Cardiac arrhythmia     Diverticulosis     Gastropathy     Nausea     Pulmonary nodule     Unintentional weight loss     Vomiting         Past Surgical History:     Past Surgical History:   Procedure Laterality Date    CYSTOSCOPY      UPPER GASTROINTESTINAL ENDOSCOPY          Medications Prior to Admission:     Prior to Admission medications    Medication Sig Start Date End Date Taking?  Authorizing Provider omeprazole (PRILOSEC) 40 MG delayed release capsule Take 40 mg by mouth daily   Yes Historical Provider, MD   aspirin 81 MG chewable tablet Take 81 mg by mouth daily   Yes Historical Provider, MD        Allergies:     Patient has no known allergies. Social History:     Tobacco:    reports that he has never smoked. He has never used smokeless tobacco.  Alcohol:      reports current alcohol use. Drug Use:  reports no history of drug use. Family History:     Family History   Problem Relation Age of Onset    Cancer Mother     Cancer Father        Review of Systems:     Positive and Negative as described in HPI. Review of Systems   Constitutional: Positive for appetite change and unexpected weight change. Negative for chills and fever. Patient reports Aksamit 40 pound weight loss over the last 3 years   HENT: Negative. Eyes: Negative. Respiratory: Negative. Cardiovascular: Positive for palpitations. Negative for chest pain and leg swelling. Reports he has a known irregular heartbeat   Gastrointestinal: Positive for abdominal pain, constipation, diarrhea, nausea and vomiting. Intermittent constipation and loose stools   Genitourinary: Negative. Musculoskeletal: Negative. Skin: Negative. Neurological: Negative. Psychiatric/Behavioral: Negative. Physical Exam:   /78   Pulse 61   Temp 98 °F (36.7 °C) (Oral)   Resp 18   Ht 5' 10.5\" (1.791 m)   Wt 164 lb 3.9 oz (74.5 kg)   SpO2 99%   BMI 23.23 kg/m²   Temp (24hrs), Av.3 °F (36.8 °C), Min:98 °F (36.7 °C), Max:98.7 °F (37.1 °C)    No results for input(s): POCGLU in the last 72 hours.     Intake/Output Summary (Last 24 hours) at 2020 1346  Last data filed at 2020 2326  Gross per 24 hour   Intake 480 ml   Output 200 ml   Net 280 ml       Physical Exam    Investigations:      Laboratory Testing:  Recent Results (from the past 24 hour(s))   CBC Auto Differential Collection Time: 12/12/20  2:35 PM   Result Value Ref Range    WBC 9.5 3.5 - 11.0 k/uL    RBC 3.45 (L) 4.5 - 5.9 m/uL    Hemoglobin 12.6 (L) 13.5 - 17.5 g/dL    Hematocrit 37.9 (L) 41 - 53 %    .9 (H) 80 - 100 fL    MCH 36.7 (H) 26 - 34 pg    MCHC 33.4 31 - 37 g/dL    RDW 14.2 12.5 - 15.4 %    Platelets 614 461 - 709 k/uL    MPV 7.9 6.0 - 12.0 fL    NRBC Automated NOT REPORTED per 100 WBC    Differential Type NOT REPORTED     Seg Neutrophils 79 (H) 36 - 66 %    Lymphocytes 7 (L) 24 - 44 %    Monocytes 13 (H) 2 - 11 %    Eosinophils % 0 (L) 1 - 4 %    Basophils 1 0 - 2 %    Immature Granulocytes NOT REPORTED 0 %    Segs Absolute 7.50 1.8 - 7.7 k/uL    Absolute Lymph # 0.60 (L) 1.0 - 4.8 k/uL    Absolute Mono # 1.30 (H) 0.1 - 1.2 k/uL    Absolute Eos # 0.00 0.0 - 0.4 k/uL    Basophils Absolute 0.10 0.0 - 0.2 k/uL    Absolute Immature Granulocyte NOT REPORTED 0.00 - 0.30 k/uL    WBC Morphology NOT REPORTED     RBC Morphology NOT REPORTED     Platelet Estimate NOT REPORTED    Basic Metabolic Panel    Collection Time: 12/12/20  2:35 PM   Result Value Ref Range    Glucose 85 70 - 99 mg/dL    BUN 21 8 - 23 mg/dL    CREATININE 1.54 (H) 0.70 - 1.20 mg/dL    Bun/Cre Ratio NOT REPORTED 9 - 20    Calcium 9.6 8.6 - 10.4 mg/dL    Sodium 144 135 - 144 mmol/L    Potassium 4.5 3.7 - 5.3 mmol/L    Chloride 96 (L) 98 - 107 mmol/L    CO2 19 (L) 20 - 31 mmol/L    Anion Gap 29 (H) 9 - 17 mmol/L    GFR Non-African American 46 (L) >60 mL/min    GFR  55 (L) >60 mL/min    GFR Comment          GFR Staging NOT REPORTED    Brain Natriuretic Peptide    Collection Time: 12/12/20  2:35 PM   Result Value Ref Range    Pro- (H) <300 pg/mL    BNP Interpretation Pro-BNP Reference Range:    Troponin    Collection Time: 12/12/20  2:35 PM   Result Value Ref Range    Troponin, High Sensitivity 22 0 - 22 ng/L    Troponin T NOT REPORTED <0.03 ng/mL    Troponin Interp NOT REPORTED    D-Dimer, Quantitative Collection Time: 12/12/20  2:35 PM   Result Value Ref Range    D-Dimer, Quant 5.14 mg/L FEU   Protime-INR    Collection Time: 12/12/20  2:35 PM   Result Value Ref Range    Protime 9.1 (L) 9.4 - 12.6 sec    INR 0.9    APTT    Collection Time: 12/12/20  2:35 PM   Result Value Ref Range    PTT <20.0 (L) 21.3 - 31.3 sec   Hepatic Function Panel    Collection Time: 12/12/20  2:35 PM   Result Value Ref Range    Alb 4.8 3.5 - 5.2 g/dL    Alkaline Phosphatase 45 40 - 129 U/L    ALT 39 5 - 41 U/L    AST 99 (H) <40 U/L    Total Bilirubin 0.68 0.3 - 1.2 mg/dL    Bilirubin, Direct 0.28 <0.31 mg/dL    Bilirubin, Indirect 0.40 0.00 - 1.00 mg/dL    Total Protein 8.1 6.4 - 8.3 g/dL    Globulin NOT REPORTED 1.5 - 3.8 g/dL    Albumin/Globulin Ratio 1.5 1.0 - 2.5   Lipase    Collection Time: 12/12/20  2:35 PM   Result Value Ref Range    Lipase 13 13 - 60 U/L   Amylase    Collection Time: 12/12/20  2:35 PM   Result Value Ref Range    Amylase 33 28 - 100 U/L   Lactic Acid    Collection Time: 12/12/20  2:35 PM   Result Value Ref Range    Lactic Acid 9.2 (H) 0.5 - 2.2 mmol/L   COVID-19    Collection Time: 12/12/20  4:07 PM    Specimen: Other   Result Value Ref Range    SARS-CoV-2          SARS-CoV-2, Rapid Not Detected Not Detected    Source . NASOPHARYNGEAL SWAB     SARS-CoV-2         Urinalysis Reflex to Culture    Collection Time: 12/12/20  4:36 PM    Specimen: Urine, clean catch   Result Value Ref Range    Color, UA YELLOW YELLOW    Turbidity UA CLEAR CLEAR    Glucose, Ur NEGATIVE NEGATIVE    Bilirubin Urine NEGATIVE NEGATIVE    Ketones, Urine SMALL (A) NEGATIVE    Specific Gravity, UA 1.027 1.005 - 1.030    Urine Hgb NEGATIVE NEGATIVE    pH, UA 5.5 5.0 - 8.0    Protein, UA NEGATIVE NEGATIVE    Urobilinogen, Urine Normal Normal    Nitrite, Urine NEGATIVE NEGATIVE    Leukocyte Esterase, Urine NEGATIVE NEGATIVE    Urinalysis Comments       Microscopic exam not performed based on chemical results unless requested in original order. No acute process. Ct Chest Pulmonary Embolism W Contrast    Result Date: 12/12/2020  2 small bilateral pulmonary nodules as above. This is not typical for COVID-19. This does not exclude a COVID infection. RECOMMENDATIONS: Multiple pulmonary nodules. Most severe: 4.0 mm ground glass pulmonary nodule. Recommend a non-contrast Chest CT at 3-6 months. If stable, consider follow-up non-contrast Chest CTs at 2 and 4 years. These guidelines do not apply to immunocompromised patients and patients with cancer. Follow up in patients with significant comorbidities as clinically warranted. For lung cancer screening, adhere to Lung-RADS guidelines. Reference: Radiology. 2017; 284(1):228-43. Assessment :      Hospital Problems           Last Modified POA    * (Principal) Non-intractable vomiting with nausea 12/13/2020 Yes    Dehydration 12/13/2020 Yes    BRYAN (acute kidney injury) ([facility] [address] 12/13/2020 Yes    Anemia 12/13/2020 Yes    Fatty liver 12/13/2020 Yes    Hiatal hernia 12/13/2020 Yes    Elevated lactic acid level 12/13/2020 Yes    Non-intractable vomiting 12/13/2020 Yes    Pulmonary nodules/lesions, multiple 12/13/2020 Yes          Plan:     Patient status inpatient in the Med/Surge    1. Antiemetics as ordered  2. Check A1c. Patient reports history of diabetes in the family. 3. Labs reviewed-creatinine improving. Lactic acid normal.  4. Advance diet as tolerated. Patient tolerating clear liquids well, but did not tolerate general diet. 5. Daily BMP. Monitor renal function. Avoid nephrotoxic agents. 6. Normal saline at 100 mL's per hour  7. Alcohol cessation education  8. Will need further follow-up as outpatient for pulmonary nodules  9. Protonix 40 mg twice daily. Carafate as ordered  10. DVT prophylaxis  11.  Check vitamin B12 levels and folate    Consultations:   None Patient is admitted as inpatient status because of co-morbidities listed above, severity of signs and symptoms as outlined, requirement for current medical therapies and most importantly because of direct risk to patient if care not provided in a hospital setting. Expected length of stay > 48 hours.     JORDYN Galarza NP  12/13/2020  1:46 PM    Copy sent to Dr. Romana Castro MD

## 2020-12-13 NOTE — PLAN OF CARE
Problem: Falls - Risk of:  Goal: Will remain free from falls  Description: Will remain free from falls  Outcome: Ongoing  Goal: Absence of physical injury  Description: Absence of physical injury  Outcome: Ongoing    : Siderails up x 2  Hourly rounding  Call light in reach  Instructed to call for assist before attempting out of bed. Remains free from falls and accidental injury at this time   Floor free from obstacles  Bed is locked and in lowest position  Adequate lighting provided  Bed alarm on, [place] star signs posted      Problem: Nausea/Vomiting:  Goal: Absence of nausea/vomiting  Description: Absence of nausea/vomiting  Outcome: Ongoing  Goal: Able to drink  Description: Able to drink  Outcome: Ongoing  Goal: Able to eat  Description: Able to eat  Outcome: Ongoing  Goal: Ability to achieve adequate nutritional intake will improve  Description: Ability to achieve adequate nutritional intake will improve  Outcome: Ongoing    : Will encourage PO intake with in reason. If PO intake is still poor, will address oral care. Problem: Pain:  Description: Pain management should include both nonpharmacologic and pharmacologic interventions. Goal: Pain level will decrease  Description: Pain level will decrease  Outcome: Ongoing  Goal: Control of acute pain  Description: Control of acute pain  Outcome: Ongoing  Goal: Control of chronic pain  Description: Control of chronic pain  Outcome: Ongoing    : Will assess for pain throughout shift and administer pain medication as needed     : Patient is in bed with no needs at this time. Call light is within reach and bed alarm is on. Will continue to monitor and assess hourly/PRN.

## 2020-12-14 VITALS
OXYGEN SATURATION: 98 % | RESPIRATION RATE: 18 BRPM | TEMPERATURE: 98.5 F | DIASTOLIC BLOOD PRESSURE: 89 MMHG | HEART RATE: 70 BPM | WEIGHT: 169.09 LBS | HEIGHT: 71 IN | BODY MASS INDEX: 23.67 KG/M2 | SYSTOLIC BLOOD PRESSURE: 145 MMHG

## 2020-12-14 PROBLEM — E86.0 DEHYDRATION: Status: RESOLVED | Noted: 2020-12-12 | Resolved: 2020-12-14

## 2020-12-14 PROBLEM — R11.10 NON-INTRACTABLE VOMITING: Status: RESOLVED | Noted: 2020-12-13 | Resolved: 2020-12-14

## 2020-12-14 PROBLEM — R79.89 ELEVATED LACTIC ACID LEVEL: Status: RESOLVED | Noted: 2020-12-13 | Resolved: 2020-12-14

## 2020-12-14 PROBLEM — R11.2 NON-INTRACTABLE VOMITING WITH NAUSEA: Status: RESOLVED | Noted: 2020-12-13 | Resolved: 2020-12-14

## 2020-12-14 LAB
ANION GAP SERPL CALCULATED.3IONS-SCNC: 7 MMOL/L (ref 9–17)
BUN BLDV-MCNC: 7 MG/DL (ref 8–23)
BUN/CREAT BLD: ABNORMAL (ref 9–20)
CALCIUM SERPL-MCNC: 8.3 MG/DL (ref 8.6–10.4)
CHLORIDE BLD-SCNC: 104 MMOL/L (ref 98–107)
CO2: 27 MMOL/L (ref 20–31)
CREAT SERPL-MCNC: 1.24 MG/DL (ref 0.7–1.2)
EKG ATRIAL RATE: 129 BPM
EKG ATRIAL RATE: 66 BPM
EKG P AXIS: 48 DEGREES
EKG P AXIS: 57 DEGREES
EKG P-R INTERVAL: 136 MS
EKG P-R INTERVAL: 142 MS
EKG Q-T INTERVAL: 318 MS
EKG Q-T INTERVAL: 434 MS
EKG QRS DURATION: 64 MS
EKG QRS DURATION: 82 MS
EKG QTC CALCULATION (BAZETT): 447 MS
EKG QTC CALCULATION (BAZETT): 512 MS
EKG R AXIS: -35 DEGREES
EKG R AXIS: -55 DEGREES
EKG T AXIS: -19 DEGREES
EKG T AXIS: -8 DEGREES
EKG VENTRICULAR RATE: 119 BPM
EKG VENTRICULAR RATE: 84 BPM
ESTIMATED AVERAGE GLUCOSE: 97 MG/DL
GFR AFRICAN AMERICAN: >60 ML/MIN
GFR NON-AFRICAN AMERICAN: 59 ML/MIN
GFR SERPL CREATININE-BSD FRML MDRD: ABNORMAL ML/MIN/{1.73_M2}
GFR SERPL CREATININE-BSD FRML MDRD: ABNORMAL ML/MIN/{1.73_M2}
GLUCOSE BLD-MCNC: 109 MG/DL (ref 70–99)
HBA1C MFR BLD: 5 % (ref 4–6)
HCT VFR BLD CALC: 32.8 % (ref 41–53)
HEMOGLOBIN: 11.1 G/DL (ref 13.5–17.5)
MAGNESIUM: 1.9 MG/DL (ref 1.6–2.6)
MCH RBC QN AUTO: 36.6 PG (ref 26–34)
MCHC RBC AUTO-ENTMCNC: 33.9 G/DL (ref 31–37)
MCV RBC AUTO: 108.1 FL (ref 80–100)
NRBC AUTOMATED: ABNORMAL PER 100 WBC
PDW BLD-RTO: 13.8 % (ref 12.5–15.4)
PLATELET # BLD: 152 K/UL (ref 140–450)
PMV BLD AUTO: 8.2 FL (ref 6–12)
POTASSIUM SERPL-SCNC: 3.6 MMOL/L (ref 3.7–5.3)
RBC # BLD: 3.03 M/UL (ref 4.5–5.9)
SODIUM BLD-SCNC: 138 MMOL/L (ref 135–144)
WBC # BLD: 4.5 K/UL (ref 3.5–11)

## 2020-12-14 PROCEDURE — 97162 PT EVAL MOD COMPLEX 30 MIN: CPT

## 2020-12-14 PROCEDURE — APPSS30 APP SPLIT SHARED TIME 16-30 MINUTES: Performed by: NURSE PRACTITIONER

## 2020-12-14 PROCEDURE — 80048 BASIC METABOLIC PNL TOTAL CA: CPT

## 2020-12-14 PROCEDURE — 96366 THER/PROPH/DIAG IV INF ADDON: CPT

## 2020-12-14 PROCEDURE — 85027 COMPLETE CBC AUTOMATED: CPT

## 2020-12-14 PROCEDURE — 83735 ASSAY OF MAGNESIUM: CPT

## 2020-12-14 PROCEDURE — 6370000000 HC RX 637 (ALT 250 FOR IP): Performed by: NURSE PRACTITIONER

## 2020-12-14 PROCEDURE — 6360000002 HC RX W HCPCS: Performed by: NURSE PRACTITIONER

## 2020-12-14 PROCEDURE — 97166 OT EVAL MOD COMPLEX 45 MIN: CPT

## 2020-12-14 PROCEDURE — 97535 SELF CARE MNGMENT TRAINING: CPT

## 2020-12-14 PROCEDURE — 2580000003 HC RX 258: Performed by: NURSE PRACTITIONER

## 2020-12-14 PROCEDURE — 97116 GAIT TRAINING THERAPY: CPT

## 2020-12-14 PROCEDURE — 36415 COLL VENOUS BLD VENIPUNCTURE: CPT

## 2020-12-14 PROCEDURE — 99253 IP/OBS CNSLTJ NEW/EST LOW 45: CPT | Performed by: INTERNAL MEDICINE

## 2020-12-14 PROCEDURE — 96372 THER/PROPH/DIAG INJ SC/IM: CPT

## 2020-12-14 PROCEDURE — 99232 SBSQ HOSP IP/OBS MODERATE 35: CPT | Performed by: INTERNAL MEDICINE

## 2020-12-14 PROCEDURE — 93005 ELECTROCARDIOGRAM TRACING: CPT | Performed by: INTERNAL MEDICINE

## 2020-12-14 RX ORDER — POLYETHYLENE GLYCOL 3350 17 G/17G
17 POWDER, FOR SOLUTION ORAL DAILY
Status: DISCONTINUED | OUTPATIENT
Start: 2020-12-15 | End: 2020-12-14 | Stop reason: HOSPADM

## 2020-12-14 RX ADMIN — FOLIC ACID 1 MG: 1 TABLET ORAL at 08:47

## 2020-12-14 RX ADMIN — PANTOPRAZOLE SODIUM 40 MG: 40 TABLET, DELAYED RELEASE ORAL at 17:35

## 2020-12-14 RX ADMIN — Medication 100 MG: at 08:46

## 2020-12-14 RX ADMIN — ASPIRIN 81 MG: 81 TABLET, CHEWABLE ORAL at 08:47

## 2020-12-14 RX ADMIN — HEPARIN SODIUM 5000 UNITS: 5000 INJECTION INTRAVENOUS; SUBCUTANEOUS at 06:16

## 2020-12-14 RX ADMIN — SUCRALFATE 1 G: 1 TABLET ORAL at 06:15

## 2020-12-14 RX ADMIN — SODIUM CHLORIDE: 9 INJECTION, SOLUTION INTRAVENOUS at 03:23

## 2020-12-14 RX ADMIN — PANTOPRAZOLE SODIUM 40 MG: 40 TABLET, DELAYED RELEASE ORAL at 06:15

## 2020-12-14 ASSESSMENT — PAIN DESCRIPTION - FREQUENCY: FREQUENCY: CONTINUOUS

## 2020-12-14 ASSESSMENT — PAIN - FUNCTIONAL ASSESSMENT: PAIN_FUNCTIONAL_ASSESSMENT: ACTIVITIES ARE NOT PREVENTED

## 2020-12-14 ASSESSMENT — PAIN SCALES - GENERAL
PAINLEVEL_OUTOF10: 0
PAINLEVEL_OUTOF10: 4

## 2020-12-14 ASSESSMENT — PAIN DESCRIPTION - LOCATION: LOCATION: CHEST

## 2020-12-14 ASSESSMENT — PAIN DESCRIPTION - PAIN TYPE: TYPE: ACUTE PAIN

## 2020-12-14 NOTE — PROGRESS NOTES
[facility]  Office: [phone]  Cherelle Prema, DO, Hood Smoker, DO, Julio Davila, DO, Nitza Guallpa Blood, DO, Colonel Rosendo MD, Bridget Ron MD, Kia Mcgregor MD, Mindy Deal MD, Yogesh Shook MD, Jose Snow MD, Vonda Enciso MD, Mercy Colvin MD, Hemant Sifuentes MD, Gina Haley, DO, Michelle Crook MD, Keenan Evans MD, Bobby Hennessy DO, Zhanna Valenzuela MD,  Oswald Nj, DO, Korey Jara MD, Mariana Arredondo MD, Brigido Gamez, [facility], [facility] [place], CNP, Nicole Delgado, CNP, Zuleika Mix, CNS, Ifrah Johnson, CNP, Conner Issa, CNP, Delana Spatz, CNP, Chris Melendez, CNP, Shannon Montoya, CNP, Veean Crawford, PARayC, Carlita Velazco, [facility], Indy Lights, CNP, Va Dies, CNP, Dulce Maria Kallmaria teresa, CNP, Gm Button, CNP, Layton Maria, [address]   [address] [place] [address]    Progress Note    12/14/2020    7:48 AM    Name:   Naye Owens  MRN:     [id number]     Acct:      [de-identified]   Room:   [address] [place] [address] Day:  1  Admit Date:  12/12/2020  2:14 PM    PCP:   Ema Ross MD  Code Status:  Full Code    Subjective:     C/C: burning in chest after drinking, nausea  Chief Complaint   Patient presents with    Emesis     onset at 12 today, pt states unable to keep anything down    Tachycardia     Interval History Status: not changed. Patient resting in bed. His nausea and burning/discomfort in his mid chest continues. He says the burning discomfort occurs after taking fluids. Also complains of feeling hot and cold throughout the night. No complaints of shortness of breath, dizziness, headache, abdominal pain. He reports he had to loose bowel movements yesterday after taking MiraLAX. Brief History:     Per previous documentation:    Naye Owens is a 60 y.o.  Non-/non  male who presents with Emesis (onset at 0300 today, pt states unable to keep anything down) and Tachycardia and is admitted to the hospital for the management of Non-intractable vomiting with nausea.     Patient reports his vomiting started at 3 AM yesterday morning. Also had complaints of fast heartbeat. He has had episodes of vomiting after eating for the last 2 years. In 2018 he had an EGD with Dr Blas Dennison which revealed congestive gastropathy. Biopsy of the gastric antrum revealed regenerative changes of foveolar epithelium. Patient also has had a colonoscopy with biopsy that revealed a tubular adenoma. Patient has a documented family history of colon cancer. Patient also complains of frequent hiccups and complains of burning from his throat to his stomach. He admits to consuming 2 to 3 glasses of cardiac or christopher daily. He also reports he has a history of irregular heartbeat. He says he does  feel irregular heartbeats from time to time. He reports unintentional steady weight loss of around 40 pounds over the last 2 to 3 years as well. While in the ED, twelve-lead EKG revealed sinus tach with heart rate of 118 with frequent couplets. CT of the chest PE with contrast was completed and revealed multiple pulmonary nodules. CT of the abdomen pelvis was completed and revealed fatty liver. Gallbladder, pancreas, and spleen were unremarkable. A small hiatal hernia seen. No findings suspicious for bowel obstruction along with scattered colonic diverticula. No acute diverticulitis seen. Creatinine slightly elevated at 1.54, proBNP 485, troponin 22, lactic acid 9.2. He was given IV fluid boluses and admitted to the inpatient nursing unit for non-intractable nausea and vomiting and dehydration. Patient had episode of emesis after attempting general diet yesterday. His diet was then downgraded to full liquids. He continues to complain of chest burning after taking fluids. He also continues to complain of nausea but no further episodes of vomiting. He was started on Carafate yesterday and his Protonix was increased from daily to twice daily. Patient placed n.p.o. this morning around 6 AM and GI consult placed. IV hydration continues. Creatinine improving. Review of Systems:     Constitutional:  negative for fevers, sweats, + chills  Respiratory:  negative for cough, dyspnea on exertion, shortness of breath, wheezing  Cardiovascular:  negative for chest pain, chest pressure/discomfort, lower extremity edema, palpitations  Gastrointestinal:  negative for abdominal pain, constipation, diarrhea,  Vomiting, + chest pain after taking PO, + nausea  Neurological:  negative for dizziness, headache    Medications:      Allergies:  No Known Allergies    Current Meds:   Scheduled Meds:    [Held by provider] sucralfate  1 g Oral 4 times per day    pantoprazole  40 mg Oral BID AC    polyethylene glycol  17 g Oral BID    aspirin  81 mg Oral Daily    sodium chloride flush  10 mL Intravenous 2 times per day    [Held by provider] heparin (porcine)  5,000 Units Subcutaneous 3 times per day    thiamine  100 mg Oral Daily    folic acid  1 mg Oral Daily     Continuous Infusions:    sodium chloride 100 mL/hr at 12/14/20 0323     PRN Meds: calcium carbonate, sodium chloride flush, potassium chloride **OR** potassium alternative oral replacement **OR** potassium chloride, magnesium sulfate, promethazine **OR** ondansetron, nicotine, acetaminophen **OR** acetaminophen, LORazepam **OR** LORazepam **OR** LORazepam **OR** LORazepam **OR** LORazepam **OR** LORazepam **OR** LORazepam **OR** LORazepam    Data: Past Medical History:   has a past medical history of Cardiac arrhythmia, Diverticulosis, Gastropathy, Nausea, Pulmonary nodule, Unintentional weight loss, and Vomiting. Social History:   reports that he has never smoked. He has never used smokeless tobacco. He reports current alcohol use. He reports that he does not use drugs. Family History:   Family History   Problem Relation Age of Onset   Adela Curl Cancer Mother     Cancer Father        Vitals:  BP (P) 130/88   Pulse (P) 75   Temp (P) 97.9 °F (36.6 °C) (Oral)   Resp (P) 20   Ht 5' 10.5\" (1.791 m)   Wt 169 lb 1.5 oz (76.7 kg)   SpO2 99%   BMI 23.92 kg/m²   Temp (24hrs), Av °F (36.7 °C), Min:97.4 °F (36.3 °C), Max:98.6 °F (37 °C)    No results for input(s): POCGLU in the last 72 hours. I/O (24Hr): Intake/Output Summary (Last 24 hours) at 2020 0748  Last data filed at 2020 8069  Gross per 24 hour   Intake 2250 ml   Output 1450 ml   Net 800 ml       Labs:  Hematology:  Recent Labs     20  1435 20  0610 20  0643   WBC 9.5 4.7 4.5   RBC 3.45* 2.91* 3.03*   HGB 12.6* 10.6* 11.1*   HCT 37.9* 31.2* 32.8*   .9* 107.4* 108.1*   MCH 36.7* 36.5* 36.6*   MCHC 33.4 34.0 33.9   RDW 14.2 14.2 13.8    154 152   MPV 7.9 7.8 8.2   INR 0.9 0.9  --    DDIMER 5.14  --   --      Chemistry:  Recent Labs     20  1435 20  0610 20  0643    137 138   K 4.5 3.8 3.6*   CL 96* 100 104   CO2 19* 30 27   GLUCOSE 85 121* 109*   BUN 21 16 7*   CREATININE 1.54* 1.37* 1.24*   MG  --  1.5* 1.9   ANIONGAP 29* 7* 7*   LABGLOM 46* 52* 59*   GFRAA 55* >60 >60   CALCIUM 9.6 8.2* 8.3*   PROBNP 485*  --   --    TROPHS 22  --   --      Recent Labs     20  1435   PROT 8.1   LABALBU 4.8   AST 99*   ALT 39   ALKPHOS 45   BILITOT 0.68   BILIDIR 0.28   AMYLASE 33   LIPASE 13         Radiology:  Ct Abdomen Pelvis Wo Contrast Additional Contrast? None    Result Date: 2020  Fatty liver. Small hiatal hernia. Xr Chest Portable    Result Date: 12/12/2020  No acute process. Ct Chest Pulmonary Embolism W Contrast    Result Date: 12/12/2020  2 small bilateral pulmonary nodules as above. This is not typical for COVID-19. This does not exclude a COVID infection. RECOMMENDATIONS: Multiple pulmonary nodules. Most severe: 4.0 mm ground glass pulmonary nodule. Recommend a non-contrast Chest CT at 3-6 months. If stable, consider follow-up non-contrast Chest CTs at 2 and 4 years. These guidelines do not apply to immunocompromised patients and patients with cancer. Follow up in patients with significant comorbidities as clinically warranted. For lung cancer screening, adhere to Lung-RADS guidelines. Reference: Radiology. 2017; 284(1):228-43. Physical Examination:       General appearance:  alert, cooperative and no distress  Mental Status:  oriented to person, place and time and flat affect  Lungs:  clear to auscultation bilaterally, normal effort  Heart:  regular rate and regular rhythm, no murmur, SR with occas PACs on telemetry  Abdomen:  soft, nontender, nondistended, normal bowel sounds, no masses, hepatomegaly, splenomegaly  Extremities:  no edema, redness, tenderness in the calves  Skin:  no gross lesions, rashes, induration    Assessment:     Hospital Problems           Last Modified POA    * (Principal) Non-intractable vomiting with nausea 12/13/2020 Yes    Dehydration 12/13/2020 Yes    BRYAN (acute kidney injury) ([facility] [address] 12/13/2020 Yes    Anemia 12/13/2020 Yes    Fatty liver 12/13/2020 Yes    Hiatal hernia 12/13/2020 Yes    Elevated lactic acid level 12/13/2020 Yes    Non-intractable vomiting 12/13/2020 Yes    Pulmonary nodules/lesions, multiple 12/13/2020 Yes          Plan:     1. Consult GI. Await formal evaluation. 2. Antiemetics as ordered  3. Keep n.p.o. for now except meds with sips  4. A1c pending  5. Daily BMP. Hydration status improving. Creatinine improving. Avoid nephrotoxic agents. 6. Continue normal saline at 100 mL/hour. Monitor intake and output. Daily weights. 7. Consult dietitian for progressive weight loss  8. Daily CBC. Folate and B12 normal  9. Alcohol cessation education prior to discharge  10. Protonix 40 mg twice daily. Hold Carafate until GI sees  11. Hold subcu heparin until GI evaluates. EPC cuffs for DVT prophylaxis  12. Further work-up of pulmonary nodules/lesions as outpatient per PCP  13.  Increase activity as tolerated    JORDYN Gross - NP  12/14/2020  7:48 AM

## 2020-12-14 NOTE — PLAN OF CARE
Problem: Falls - Risk of:  Goal: Will remain free from falls  Description: Will remain free from falls  Outcome: Met This Shift  Goal: Absence of physical injury  Description: Absence of physical injury  Outcome: Met This Shift     Problem: Nausea/Vomiting:  Goal: Absence of nausea/vomiting  Description: Absence of nausea/vomiting  Outcome: Met This Shift  Goal: Able to drink  Description: Able to drink  Outcome: Met This Shift  Goal: Able to eat  Description: Able to eat  Outcome: Met This Shift  Goal: Ability to achieve adequate nutritional intake will improve  Description: Ability to achieve adequate nutritional intake will improve  Outcome: Met This Shift     Problem: Pain:  Goal: Pain level will decrease  Description: Pain level will decrease  Outcome: Met This Shift  Goal: Control of acute pain  Description: Control of acute pain  Outcome: Met This Shift  Goal: Control of chronic pain  Description: Control of chronic pain  Outcome: Met This Shift     Problem: Nutrition  Goal: Optimal nutrition therapy  Outcome: Met This Shift     Problem: Musculor/Skeletal Functional Status  Goal: Highest potential functional level  Outcome: Met This Shift  Goal: Absence of falls  Outcome: Met This Shift

## 2020-12-14 NOTE — CARE COORDINATION
Case Management Initial Discharge Plan  Ravi Butcher,             Met with:patient to discuss discharge plans. Information verified: address, contacts, phone number, , insurance Yes    Emergency Contact/Next of Kin name & number: Faisal Jimenez wife [phone]    PCP: Divine Matias MD  Date of last visit: over 1 year    Insurance Provider: [address] [place] [address]"[employer]"[facility] /    Discharge Planning    Living Arrangements:  Family Members   Support Systems:  Parent, [id number] has 1 stories  8 stairs to climb to get into front door, stairs to climb to reach second floor  Location of bedroom/bathroom in home 1    Patient able to perform ADL's:Independent    Current Services (outpatient & in home) none  DME equipment:   DME provider:     Receiving oral anticoagulation therapy? Yes or No    If indicated:   Physician managing anticoagulation treatment:   Where does patient obtain lab work for ATC treatment? Potential Assistance Needed:  N/A    Patient agreeable to home care: No  [place] of choice provided:  no    Prior SNF/Rehab Placement and Facility:   Agreeable to SNF/Rehab: No  [place] of choice provided: no     Evaluation: no    Expected Discharge date:  20    Patient expects to be discharged to:  home  Follow Up Appointment: Best Day/ Time: Wednesday PM    Transportation provider: self  Transportation arrangements needed for discharge: No    Readmission Risk              Risk of Unplanned Readmission:        17             Does patient have a readmission risk score greater than 14?: Yes  If yes, follow-up appointment must be made within 7 days of discharge.      Goals of Care: improve nausea and vomiting      Discharge Plan: home with spouse, independent          Electronically signed by Shmuel Cristobal RN on 20 at 4:02 PM EST

## 2020-12-14 NOTE — CONSULTS
GI CONSULTATION      REASON FOR CONSULT: Nausea, vomiting, odynophagia, weight loss    REQUESTING PHYSICIAN:  Sanjana Brownlee MD    HISTORY OF PRESENT ILLNESS:    The patient is a 60 y.o. male who presents with nausea, vomiting associated with odynophagia and weight loss. He has history of alcohol abuse and drinks about 4 drinks of christopher in a day. He has been having nausea, vomiting associated with some reflux for over 1-1/2 years. He takes omeprazole on as-needed basis and takes only about 5 to 6 pills in a month. He has symptoms of reflux and odynophagia up to 4 5 times in a week. He has lost over 30 pounds and the last 2 years. A CT scan here showed hiatal hernia as well as fatty liver disease. His AST was mildly elevated. He has family history of prostate cancer and some other cancer in mother but not colon cancer. his last colonoscopy was about 2 years ago and had tubular adenoma. Medical History:   Past Medical History:   Diagnosis Date    Cardiac arrhythmia     Diverticulosis     Gastropathy     Nausea     Pulmonary nodule     Unintentional weight loss     Vomiting        SURGICAL HISTORY:  Past Surgical History:   Procedure Laterality Date    CYSTOSCOPY      UPPER GASTROINTESTINAL ENDOSCOPY         MEDS:  Prior to Admission medications    Medication Sig Start Date End Date Taking?  Authorizing Provider   omeprazole (PRILOSEC) 40 MG delayed release capsule Take 40 mg by mouth daily   Yes Historical Provider, MD   aspirin 81 MG chewable tablet Take 81 mg by mouth daily   Yes Historical Provider, MD     Scheduled Meds:   [START ON 12/15/2020] polyethylene glycol  17 g Oral Daily    [Held by provider] sucralfate  1 g Oral 4 times per day    pantoprazole  40 mg Oral BID AC    aspirin  81 mg Oral Daily    sodium chloride flush  10 mL Intravenous 2 times per day  [Held by provider] heparin (porcine)  5,000 Units Subcutaneous 3 times per day    thiamine  100 mg Oral Daily    folic acid  1 mg Oral Daily     Continuous Infusions:   sodium chloride 100 mL/hr at 12/14/20 0323     PRN Meds:calcium carbonate, sodium chloride flush, potassium chloride **OR** potassium alternative oral replacement **OR** potassium chloride, magnesium sulfate, promethazine **OR** ondansetron, nicotine, acetaminophen **OR** acetaminophen, LORazepam **OR** LORazepam **OR** LORazepam **OR** LORazepam **OR** LORazepam **OR** LORazepam **OR** LORazepam **OR** LORazepam    No Known Allergies    SOCIAL HISTORY:   Social History     Socioeconomic History    Marital status:      Spouse name: Not on file    Number of children: Not on file    Years of education: Not on file    Highest education level: Not on file   Occupational History    Not on file   Social Needs    Financial resource strain: Not on file    Food insecurity     Worry: Not on file     Inability: Not on file    Transportation needs     Medical: Not on file     Non-medical: Not on file   Tobacco Use    Smoking status: Never Smoker    Smokeless tobacco: Never Used   Substance and Sexual Activity    Alcohol use: Yes     Frequency: 4 or more times a week     Drinks per session: 3 or 4     Comment: dailr 2-3 glasses cognac/ bourbon    Drug use: Never    Sexual activity: Not on file   Lifestyle    Physical activity     Days per week: Not on file     Minutes per session: Not on file    Stress: Not on file   Relationships    Social connections     Talks on phone: Not on file     Gets together: Not on file     Attends Taoism service: Not on file     Active member of club or organization: Not on file     Attends meetings of clubs or organizations: Not on file     Relationship status: Not on file    Intimate partner violence     Fear of current or ex partner: Not on file     Emotionally abused: Not on file LABALBU 4.8  --   --    AST 99*  --   --    ALT 39  --   --    ALKPHOS 45  --   --    BILITOT 0.68  --   --    BILIDIR 0.28  --   --    AMYLASE 33  --   --    LIPASE 13  --   --    MG  --  1.5* 1.9     Recent Labs     12/12/20  1435 12/13/20  0610   INR 0.9 0.9   PROTIME 9.1* 9.9       IMPRESSION:  1.  Nausea with vomiting likely associated with reflux esophagitis  2. GERD likely associated with esophagitis  3. Weight loss with poor p.o. intake likely from odynophagia  4. Mildly elevation in AST and fatty liver on CT consistent with alcoholic hepatitis  5. Alcohol abuse    RECOMMENDATIONS:    I agree with PPI therapy twice daily. I counseled him extensively to quit alcohol use, which he states that he will try to do himself. We will schedule him for upper endoscopy tomorrow at [facility]. He can be discharged from GI standpoint today.   Discussed with the primary team.    Electronically signed by Nadine Tobar MD  on 12/14/2020 at 4:28 PM

## 2020-12-14 NOTE — CONSULTS
Comprehensive Nutrition Assessment    Type and Reason for Visit:  Consult, Initial(unintentional wt loss)    Nutrition Recommendations/Plan:   Continue with NPO and follow for ability to initiate/advance diet as medically appropriate      Nutrition Assessment:  Pt nutritionally compromised due to admission for N/V and dehydration. . At risk for further compromise due to NPO status and limited PO intakes over the past 2 days when on an oral diet. Pt states he has been having this problem at home for awhile with addition of at times vomiting after he eats whether its food or liquid. Pt's wt records reviewed and discussed with pt. States his wt has fluctuated 5 lb over the past year, but usual wt is around 160lb. Does report he had his wt checked 2 weeks ago and was 154lb (6lb loss from usual). Has consumed ONS at home in past due to poor intakes, but not recently. Will follow for ability to initiate PO diet as medically appropriate and follow for education as needed. Malnutrition Assessment:  Malnutrition Status: At risk for malnutrition (Comment)(due to NPO status)    Context:  Chronic Illness     Findings of the 6 clinical characteristics of malnutrition:  Energy Intake:  (varied intakes)  Weight Loss:  1 - Mild weight loss (specify amount and time period)(wt fluctuating by 5 lb over the past yr)     Body Fat Loss:  Unable to assess     Muscle Mass Loss:  No significant muscle mass loss Temples (temporalis)  Fluid Accumulation:  No significant fluid accumulation     Strength:  Not Performed    Estimated Daily Nutrient Needs:  Energy (kcal):  1925-2150kcal/day(25-28kcal/kg); Weight Used for Energy Requirements:  Current     Protein (g):  75-90 g/day(25-28kcal/kg);   Weight Used for Protein Requirements:  Current        Fluid (ml/day):  3694-1766 ml/day; Method Used for Fluid Requirements:  1 ml/kcal      Nutrition Related Findings:  heartburn, poor po intakes, gi consult, hx congestive gastropathy Wounds:  None       Current Nutrition Therapies:    Diet NPO Effective Now Exceptions are: Sips with Meds    Anthropometric Measures:  · Height: 5' 10.5\" (179.1 cm)  · Current Body Weight: 169 lb (76.7 kg)   · Admission Body Weight: 162 lb (73.5 kg)    · Usual Body Weight: 160 lb (72.6 kg)(1 yr ago)     · Ideal Body Weight: 169 lbs; % Ideal Body Weight 100 %   · BMI: 23.9  · Adjusted Body Weight:  ; No Adjustment   · BMI Categories: Normal Weight (BMI 22.0 to 24.9) age over 72       Nutrition Diagnosis:   · Inadequate oral intake related to altered GI function as evidenced by NPO or clear liquid status due to medical condition, poor intake prior to admission, weight loss      Nutrition Interventions:   Food and/or Nutrient Delivery:  Continue NPO  Nutrition Education/Counseling:  Education not appropriate   Coordination of Nutrition Care:  Continue to monitor while inpatient    Goals:  PO to meet >75% of needs       Nutrition Monitoring and Evaluation:   Behavioral-Environmental Outcomes:  None Identified   Food/Nutrient Intake Outcomes:  Diet Advancement/Tolerance, Food and Nutrient Intake  Physical Signs/Symptoms Outcomes:  Biochemical Data, GI Status, Nausea or Vomiting, Nutrition Focused Physical Findings, Weight     Discharge Planning:     Too soon to determine     Electronically signed by Sabrina León RD, LD on 12/14/20 at 11:07 AM JORGE León RD,LD  Clinical Dietitian  [facility]  [phone]

## 2020-12-14 NOTE — PROGRESS NOTES
Vision Exceptions: Wears glasses at all times  Hearing: Exceptions to [facility]  Hearing Exceptions: Hard of hearing/hearing concerns     Subjective  General  Patient assessed for rehabilitation services?: Yes  Response To Previous Treatment: Not applicable  Family / Caregiver Present: No  Follows Commands: Within Functional Limits  Subjective  Subjective: Pt supine in bed and agreeable to therapy; RN agreeable to therapy. Pt complains of chest/esophagus pain. Pain Screening  Patient Currently in Pain: Yes  Pain Assessment  Pain Assessment: 0-10  Pain Level: 4  Pain Type: Acute pain  Pain Location: Chest  Pain Frequency: Continuous  Functional Pain Assessment: Activities are not prevented  Non-Pharmaceutical Pain Intervention(s): Ambulation/Increased Activity; Distraction  Response to Pain Intervention: Patient Satisfied  Vital Signs  Patient Currently in Pain: Yes       Orientation  Orientation  Overall Orientation Status: Within Functional Limits  Social/Functional History  Social/Functional History  Lives With: Family(Lives with wife and grandchild)  Type of Home: Apartment  Home Layout: One level  Home Access: Stairs to enter with rails  Entrance Stairs - Number of Steps: 8  Entrance Stairs - Rails: Both  Bathroom Shower/Tub: Tub/Shower unit  Bathroom Toilet: Handicap height  Bathroom Equipment: (Pt reports none)  Bathroom Accessibility: Accessible  Home Equipment: [employer] Help From: Family(Pt reports receiving help from wife PRN)  ADL Assistance: Independent  Homemaking Assistance: Independent  Homemaking Responsibilities: Yes  Ambulation Assistance: Independent  Transfer Assistance: Independent  Active : Yes  Mode of Transportation: Car, SUV, Truck  Occupation: Retired  Type of occupation: Previously worked as a   Leisure & Hobbies: Enjoys playing cards and watching sports  Cognition   Cognition  Overall Cognitive Status: WFL    Objective     Observation/Palpation  Posture: Good AROM RLE (degrees)  RLE AROM: WFL  AROM LLE (degrees)  LLE AROM : WFL  AROM RUE (degrees)  RUE AROM : WFL  AROM LUE (degrees)  LUE AROM : WFL  Strength RLE  Strength RLE: WNL  Comment: Grossly 5/5  Strength LLE  Strength LLE: WNL  Comment: Grossly 5/5  Strength RUE  Strength RUE: WNL  Comment: Grossly 5/5  Strength LUE  Strength LUE: WNL  Comment: Grossly 5/5  Motor Control  Gross Motor?: WFL  Sensation  Overall Sensation Status: WFL(Pt denies any numbness or tingling)  Bed mobility  Bridging: Stand by assistance  Supine to Sit: Contact guard assistance  Sit to Supine: Contact guard assistance  Scooting: Contact guard assistance  Comment: With HOB raised ~30*  Transfers  Sit to Stand: Contact guard assistance  Stand to sit: Contact guard assistance  Comment: Pt performed to and from toilet x1, and to and from EOB x1  Ambulation  Ambulation?: Yes  More Ambulation?: No  Ambulation 1  Surface: level tile  Device: No Device  Assistance: Contact guard assistance  Quality of Gait: Pt ambulates with a slight deviation, mild unsteadiness  Gait Deviations: Deviated path  Distance: 150ft x1  Stairs/Curb  Stairs?: No     Balance  Posture: Good  Sitting - Static: Good;-  Sitting - Dynamic: Good;-  Standing - Static: Good;-  Standing - Dynamic: Fair;+  Comments: Standing balance assessed without AD        Plan   Plan  Times per week: 5-6x  Times per day: Daily  Current Treatment Recommendations: Home Exercise Program, Balance Training, Endurance Training, Functional Mobility Training, Transfer Training, Gait Training, Stair training  Safety Devices  Type of devices: Nurse notified, Left in bed, Gait belt, Call light within reach  Restraints  Initially in place: No    AM-PAC Score  AM-PAC Inpatient Mobility Raw Score : 16 (12/14/20 1200)  AM-PAC Inpatient T-Scale Score : 40.78 (12/14/20 1200)  Mobility Inpatient CMS 0-100% Score: 54.16 (12/14/20 1200)  Mobility Inpatient CMS G-Code Modifier : CK (12/14/20 1200)          Goals Short term goals  Time Frame for Short term goals: 15  Short term goal 1: Pt will ambulate 300ft with independence in order to navigate community distances. Short term goal 2: Pt will negotiate 8 steps with modified independence using bilateral handrails in order to enter his home. Short term goal 3: Pt will transfer sit <> supine with independence to decrease his fall risk. Short term goal 4: Pt will transfer sit <> stand with independence to get off the toilet safely. Short term goal 5: Pt will tolerate 30 minutes of therpay to improve his endurance. Patient Goals   Patient goals : To return home       Therapy Time   Individual Concurrent Group Co-treatment   Time In 1125         Time Out 1144         Minutes 19         Timed Code Treatment Minutes: 8 Minutes   Co-evaluation with OT. Italo Dalal, SPT  Evaluation/treatment performed by Student PT under the supervision of co-signing PT who agrees with all evaluation/treatment and documentation.

## 2020-12-14 NOTE — PLAN OF CARE
Problem: Falls - Risk of:  Siderails up x 2  Hourly rounding  Call light in reach  Instructed to call for assist before attempting out of bed. Remains free from falls and accidental injury at this time   Floor free from obstacles  Bed is locked and in lowest position  Adequate lighting provided  Bed alarm on, [place] star and Stay with Me signs posted      Goal: Will remain free from falls  Description: Will remain free from falls  12/13/2020 2149 by Jg Patterson RN  Outcome: Ongoing  12/13/2020 1029 by Smiley Diop RN  Outcome: Ongoing  Goal: Absence of physical injury  Description: Absence of physical injury  12/13/2020 2149 by Jg Patterson RN  Outcome: Ongoing  12/13/2020 1029 by Smiley Diop RN  Outcome: Ongoing     Problem: Nausea/Vomiting:  Carafate and protonix given as ordered. Phenergan prn. Goal: Absence of nausea/vomiting  Description: Absence of nausea/vomiting  12/13/2020 2149 by Jg Patterson RN  Outcome: Ongoing  12/13/2020 1029 by Smiley Diop RN  Outcome: Ongoing  Goal: Able to drink  Description: Able to drink  12/13/2020 2149 by Jg Patterson RN  Outcome: Ongoing  12/13/2020 1029 by Smiley Diop RN  Outcome: Ongoing  Goal: Able to eat  Description: Able to eat  12/13/2020 2149 by Jg Patterson RN  Outcome: Ongoing  12/13/2020 1029 by Smiley Diop RN  Outcome: Ongoing  Goal: Ability to achieve adequate nutritional intake will improve  Description: Ability to achieve adequate nutritional intake will improve  12/13/2020 2149 by Jg Patterson RN  Outcome: Ongoing  12/13/2020 1029 by Smiley Diop RN  Outcome: Ongoing     Problem: Pain:  Pain level assessment complete.    Patient educated on pain scale and control interventions  PRN pain medication given per patient request  Patient instructed to call out with new onset of pain or unrelieved pain    Goal: Pain level will decrease  Description: Pain level will decrease  12/13/2020 2149 by Jg Patterson RN Outcome: Ongoing  12/13/2020 1029 by Maira Watt RN  Outcome: Ongoing  Goal: Control of acute pain  Description: Control of acute pain  12/13/2020 2149 by Nelia Gardner RN  Outcome: Ongoing  12/13/2020 1029 by Maira Watt RN  Outcome: Ongoing  Goal: Control of chronic pain  Description: Control of chronic pain  12/13/2020 2149 by Nelia Gardner RN  Outcome: Ongoing  12/13/2020 1029 by Maira Watt RN  Outcome: Ongoing

## 2020-12-14 NOTE — PROGRESS NOTES
Comments: RN ok'd for therapy this AM. Pt agreeable to participate in session and pleasant/cooperative throughout. Patient Currently in Pain: Denies  Oxygen Therapy  SpO2: 98 %  O2 Device: None (Room air)     Social/Functional History  Social/Functional History  Lives With: Family(Lives with wife and grandchild)  Type of Home: Apartment  Home Layout: One level  Home Access: Stairs to enter with rails  Entrance Stairs - Number of Steps: 8  Entrance Stairs - Rails: Both  Bathroom Shower/Tub: Tub/Shower unit  Bathroom Toilet: Handicap height  Bathroom Equipment: (Pt reports none)  Bathroom Accessibility: Accessible  Home Equipment: [place] [employer] Help From: Family(Pt reports receiving help from wife PRN)  ADL Assistance: Independent  Homemaking Assistance: Independent  Homemaking Responsibilities: Yes  Ambulation Assistance: Independent  Transfer Assistance: Independent  Active : Yes  Mode of Transportation: Car, SUV, Truck  Occupation: Retired  Type of occupation: Previously worked as a   Leisure & Hobbies: Enjoys playing cards and watching sports       Objective   Vision: Impaired  Vision Exceptions: Wears glasses at all times  Hearing: Exceptions to [facility]  Hearing Exceptions: Hard of hearing/hearing concerns    Orientation  Overall Orientation Status: Within Functional Limits    Balance  Sitting Balance: Stand by assistance  Standing Balance: Contact guard assistance    Functional Mobility  Functional - Mobility Device: No device  Activity: To/from bathroom  Assist Level: Contact guard assistance    Toilet Transfers  Toilet - Technique: Ambulating  Equipment Used: Standard toilet  Toilet Transfer: Contact guard assistance     ADL  Feeding: Independent  Grooming: Stand by assistance; Increased time to complete(standing sink side to perform hand hygiene)  UE Bathing: Increased time to complete;Stand by assistance  LE Bathing: Increased time to complete;Contact guard assistance UE Dressing: Increased time to complete;Stand by assistance  LE Dressing: Increased time to complete;Contact guard assistance(seated EOB to doff/don socks)  Toileting: Increased time to complete;Contact guard assistance(for LB clothing mngt and toilet transfer; use of grab bars)     Tone RUE  RUE Tone: Normotonic  Tone LUE  LUE Tone: Normotonic  Coordination  Movements Are Fluid And Coordinated: Yes        Bed mobility  Bridging: Stand by assistance  Supine to Sit: Contact guard assistance  Sit to Supine: Contact guard assistance  Scooting: Contact guard assistance  Comment: HOB raised ~30*, increased time/effort     Transfers  Sit to stand: Contact guard assistance  Stand to sit: Contact guard assistance        Cognition  Overall Cognitive Status: WFL           Sensation  Overall Sensation Status: WFL(Pt denies any numbness or tingling)        LUE AROM (degrees)  LUE AROM : WFL  RUE AROM (degrees)  RUE AROM : WFL  LUE Strength  Gross LUE Strength: WFL  RUE Strength  Gross RUE Strength: WFL         Plan   Plan  Times per week: 5x/wk  Current Treatment Recommendations: Balance Training, Functional Mobility Training, Endurance Training, Patient/Caregiver Education & Training, Equipment Evaluation, Education, & procurement, Self-Care / ADL, Home Management Training, Safety Education & Training      AM-PAC Score  AM-[facility] Inpatient Daily Activity Raw Score: 19 (12/14/20 1533)  AM-PAC Inpatient ADL T-Scale Score : 40.22 (12/14/20 1533)  ADL Inpatient CMS 0-100% Score: 42.8 (12/14/20 1533)  ADL Inpatient CMS G-Code Modifier : CK (12/14/20 1533)    Goals  Short term goals  Time Frame for Short term goals: 14 visits  Short term goal 1: Pt will perform functional transfers/functional mobility independently  Short term goal 2: Pt will demo 15+ minutes standing tolerance with use of least restrictive AD for increased participation in ADLs  Short term goal 3: Pt will perform ADL tasks independently Short term goal 4: Pt will independently demo good safety awareness during engagement in all ADLs and functional transfers/functional mobility       Therapy Time   Individual Concurrent Group Co-treatment   Time In 1125         Time Out 1145         Minutes 20         Timed Code Treatment Minutes: 8 Minutes       Sergey Abernathy OTR/L

## 2020-12-15 ENCOUNTER — HOSPITAL ENCOUNTER (OUTPATIENT)
Age: 65
Setting detail: OUTPATIENT SURGERY
Discharge: HOME | End: 2020-12-15
Attending: INTERNAL MEDICINE | Admitting: INTERNAL MEDICINE
Payer: COMMERCIAL

## 2020-12-15 ENCOUNTER — ANESTHESIA (OUTPATIENT)
Dept: ENDOSCOPY | Age: 65
End: 2020-12-15
Payer: COMMERCIAL

## 2020-12-15 ENCOUNTER — ANESTHESIA EVENT (OUTPATIENT)
Dept: ENDOSCOPY | Age: 65
End: 2020-12-15
Payer: COMMERCIAL

## 2020-12-15 VITALS
TEMPERATURE: 98 F | DIASTOLIC BLOOD PRESSURE: 90 MMHG | WEIGHT: 156 LBS | RESPIRATION RATE: 17 BRPM | SYSTOLIC BLOOD PRESSURE: 139 MMHG | HEART RATE: 115 BPM | BODY MASS INDEX: 22.33 KG/M2 | OXYGEN SATURATION: 99 % | HEIGHT: 70 IN

## 2020-12-15 VITALS
DIASTOLIC BLOOD PRESSURE: 85 MMHG | OXYGEN SATURATION: 99 % | RESPIRATION RATE: 17 BRPM | SYSTOLIC BLOOD PRESSURE: 112 MMHG

## 2020-12-15 PROCEDURE — 3700000000 HC ANESTHESIA ATTENDED CARE: Performed by: INTERNAL MEDICINE

## 2020-12-15 PROCEDURE — 3609017100 HC EGD: Performed by: INTERNAL MEDICINE

## 2020-12-15 PROCEDURE — 2500000003 HC RX 250 WO HCPCS: Performed by: NURSE ANESTHETIST, CERTIFIED REGISTERED

## 2020-12-15 PROCEDURE — 2580000003 HC RX 258: Performed by: INTERNAL MEDICINE

## 2020-12-15 PROCEDURE — 6360000002 HC RX W HCPCS: Performed by: NURSE ANESTHETIST, CERTIFIED REGISTERED

## 2020-12-15 PROCEDURE — 7100000010 HC PHASE II RECOVERY - FIRST 15 MIN: Performed by: INTERNAL MEDICINE

## 2020-12-15 PROCEDURE — 7100000011 HC PHASE II RECOVERY - ADDTL 15 MIN: Performed by: INTERNAL MEDICINE

## 2020-12-15 PROCEDURE — 2580000003 HC RX 258: Performed by: NURSE ANESTHETIST, CERTIFIED REGISTERED

## 2020-12-15 PROCEDURE — 43235 EGD DIAGNOSTIC BRUSH WASH: CPT | Performed by: INTERNAL MEDICINE

## 2020-12-15 RX ORDER — SODIUM CHLORIDE 9 MG/ML
INJECTION, SOLUTION INTRAVENOUS ONCE
Status: COMPLETED | OUTPATIENT
Start: 2020-12-15 | End: 2020-12-15

## 2020-12-15 RX ORDER — LIDOCAINE HYDROCHLORIDE 10 MG/ML
INJECTION, SOLUTION EPIDURAL; INFILTRATION; INTRACAUDAL; PERINEURAL PRN
Status: DISCONTINUED | OUTPATIENT
Start: 2020-12-15 | End: 2020-12-15 | Stop reason: SDUPTHER

## 2020-12-15 RX ORDER — SODIUM CHLORIDE 9 MG/ML
INJECTION, SOLUTION INTRAVENOUS CONTINUOUS PRN
Status: DISCONTINUED | OUTPATIENT
Start: 2020-12-15 | End: 2020-12-15 | Stop reason: SDUPTHER

## 2020-12-15 RX ORDER — PROPOFOL 10 MG/ML
INJECTION, EMULSION INTRAVENOUS PRN
Status: DISCONTINUED | OUTPATIENT
Start: 2020-12-15 | End: 2020-12-15 | Stop reason: SDUPTHER

## 2020-12-15 RX ADMIN — LIDOCAINE HYDROCHLORIDE 100 MG: 10 INJECTION, SOLUTION EPIDURAL; INFILTRATION; INTRACAUDAL; PERINEURAL at 11:40

## 2020-12-15 RX ADMIN — SODIUM CHLORIDE: 9 INJECTION, SOLUTION INTRAVENOUS at 11:23

## 2020-12-15 RX ADMIN — PROPOFOL 100 MG: 10 INJECTION, EMULSION INTRAVENOUS at 11:40

## 2020-12-15 RX ADMIN — SODIUM CHLORIDE: 0.9 INJECTION, SOLUTION INTRAVENOUS at 11:37

## 2020-12-15 ASSESSMENT — PAIN SCALES - GENERAL
PAINLEVEL_OUTOF10: 0

## 2020-12-15 ASSESSMENT — PAIN - FUNCTIONAL ASSESSMENT: PAIN_FUNCTIONAL_ASSESSMENT: 0-10

## 2020-12-15 NOTE — ANESTHESIA POSTPROCEDURE EVALUATION
Department of Anesthesiology  Postprocedure Note    Patient: Radha Glaser  MRN: [id number]  YOB: 1955  Date of evaluation: 12/15/2020  Time:  12:28 PM     Procedure Summary     Date: 12/15/20 Room / Location: [address] [place] [address] / [facility]    Anesthesia Start: 1137 Anesthesia Stop: 3693    Procedure: EGD ESOPHAGOGASTRODUODENOSCOPY (N/A ) Diagnosis: (NAUSEA, VOMITING)    Surgeons: Esa Alfonso MD Responsible Provider: Lorin Burroughs MD    Anesthesia Type: general, MAC ASA Status: 2          Anesthesia Type: general, MAC    Juan Phase I:      Juan Phase II: Juan Score: 10    Last vitals: Reviewed and per EMR flowsheets.        Anesthesia Post Evaluation    Patient location during evaluation: PACU  Patient participation: complete - patient participated  Level of consciousness: awake and alert  Pain score: 0  Airway patency: patent  Nausea & Vomiting: no nausea and no vomiting  Complications: no  Cardiovascular status: hemodynamically stable  Respiratory status: room air  Hydration status: euvolemic

## 2020-12-15 NOTE — DISCHARGE SUMMARY
[facility]  Office: [address], DO, [facility] Saji, DO, Mendez Suresh, DO, Twyla Youssef, DO, Daily Castellano MD, Alec Leung MD, Delmy Valerio MD, Yoana Higgins MD, Adrian Maynard MD, Marcell Thompson MD, Sourav Snow MD, Cuate Richard MD, Mbonu Kathleen Ramos MD, Dylan Espino, DO, Melinda Polanco MD, Rudi Cabello MD, David Prasad DO, Liana Colvin MD,  Nora Corley DO, Leidy Hart MD, James Vickers MD, Anastacio Galindo [facility], [facility], CNP, Philip Randolph, CNP, Gertrude Nettles, CNS, Santa Livingston, CNP, Lev Bass, CNP, Dorian Tejeda, CNP, Genevieve Perea, CNP, Nohemi Walker, CNP, Maggie Chery PA-C, Tiffani Buckley, [facility], Dank Calvert, CNP, Kobi Langston, CNP, Sandhya Beck, CNP, Pat Ponce, CNP, Davis Sheriff, [facility]   [address] [facility]    Discharge Summary     Patient ID: Kymberly Benton  :  1955   MRN: [id number]     ACCOUNT:  [de-identified]   Patient's PCP: Agustin Paul MD  Admit Date: 2020   Discharge Date: 2020    Length of Stay: 1  Code Status:  Prior  Admitting Physician: Cuate Richard MD  Discharge Physician: Sandhya Beck, APRN - NP     Active Discharge Diagnoses:     Hospital Problem Lists:  Principal Problem (Resolved):    Non-intractable vomiting with nausea  Active Problems:    BRYAN (acute kidney injury) ([facility] [address]    Anemia    Fatty liver    Hiatal hernia    Pulmonary nodules/lesions, multiple  Resolved Problems:    Dehydration    Elevated lactic acid level    Non-intractable vomiting      Admission Condition:  fair     Discharged Condition: good    Hospital Stay:     Hospital Course:   Kymberly Benton is a 60 y.o. male who was admitted for the management of  Non-intractable vomiting with nausea , presented to ER with Emesis (onset at 0300 today, pt states unable to keep anything down) and Tachycardia He has had episodes of vomiting after eating for the last 2 years. In 2018 he had an EGD which revealed congestive gastropathy. Biopsy of the gastric antrum revealed regenerative changes of foveolar epithelium. He also had a colonoscopy in 2018 with biopsy that revealed a tubular adenoma. Patient does have a history of colon cancer. He also had complaints of frequent hiccups and complains of burning sensation from his throat to his stomach. He admitted to drinking 2 to 3 glasses of Cognac or christopher per day. He also reported he had a history of irregular heartbeat. He also says he has had an unintentional steady weight loss of around 40 pounds over the last 2 to 3 years. Patient had a CT of the chest PE with contrast and revealed multiple pulmonary nodules. CT of the abdomen pelvis was completed and revealed a fatty liver. Small hiatal hernia was seen. Scattered colonic diverticula were also seen with no acute diverticulitis. His creatinine was noted to be slightly elevated at 1.54, lactic acid 9.2. He was given IV fluid boluses and admitted. His diet was advanced after his nausea resolved and he did have an episode of emesis. GI was consulted at that time. Decision was made to discharge patient home as his creatinine was improving and his lactic acid had normalized. Plan for patient to have EGD as an outpatient tomorrow December 15. Patient encouraged to take a PPI daily as directed. On telemetry, patient appeared to have some degree of sinus arrhythmia in the form of periodic couplet PACs. Patient had no episodes of chest pain or shortness of breath with these extra heartbeats.        Significant therapeutic interventions:     IV hydration    PPI twice daily    Carafate    GI consultation and evaluation    Antiemetic    Significant Diagnostic Studies: As above  Labs / Micro:  CBC:   Lab Results   Component Value Date    WBC 4.5 12/14/2020    RBC 3.03 12/14/2020    HGB 11.1 12/14/2020 HCT 32.8 12/14/2020    .1 12/14/2020    MCH 36.6 12/14/2020    MCHC 33.9 12/14/2020    RDW 13.8 12/14/2020     12/14/2020     BMP:    Lab Results   Component Value Date    GLUCOSE 109 12/14/2020     12/14/2020    K 3.6 12/14/2020     12/14/2020    CO2 27 12/14/2020    ANIONGAP 7 12/14/2020    BUN 7 12/14/2020    CREATININE 1.24 12/14/2020    BUNCRER NOT REPORTED 12/14/2020    CALCIUM 8.3 12/14/2020    LABGLOM 59 12/14/2020    GFRAA >60 12/14/2020    GFR      12/14/2020    GFR NOT REPORTED 12/14/2020     U/A:    Lab Results   Component Value Date    COLORU YELLOW 12/12/2020    TURBIDITY CLEAR 12/12/2020    SPECGRAV 1.027 12/12/2020    HGBUR NEGATIVE 12/12/2020    PHUR 5.5 12/12/2020    PROTEINU NEGATIVE 12/12/2020    GLUCOSEU NEGATIVE 12/12/2020    KETUA SMALL 12/12/2020    BILIRUBINUR NEGATIVE 12/12/2020    UROBILINOGEN Normal 12/12/2020    NITRU NEGATIVE 12/12/2020    LEUKOCYTESUR NEGATIVE 12/12/2020       Radiology:  Ct Abdomen Pelvis Wo Contrast Additional Contrast? None    Result Date: 12/12/2020  Fatty liver. Small hiatal hernia. Xr Chest Portable    Result Date: 12/12/2020  No acute process. Ct Chest Pulmonary Embolism W Contrast    Result Date: 12/12/2020  2 small bilateral pulmonary nodules as above. This is not typical for COVID-19. This does not exclude a COVID infection. RECOMMENDATIONS: Multiple pulmonary nodules. Most severe: 4.0 mm ground glass pulmonary nodule. Recommend a non-contrast Chest CT at 3-6 months. If stable, consider follow-up non-contrast Chest CTs at 2 and 4 years. These guidelines do not apply to immunocompromised patients and patients with cancer. Follow up in patients with significant comorbidities as clinically warranted. For lung cancer screening, adhere to Lung-RADS guidelines. Reference: Radiology. 2017; 284(1):228-43.        Consultations:    Consults:     Final Specialist Recommendations/Findings:   IP CONSULT TO GI  IP CONSULT TO DIETITIAN The patient was seen and examined on day of discharge and this discharge summary is in conjunction with any daily progress note from day of discharge. Discharge plan:     Disposition: Home    Physician Follow Up:     Kimmy Cole MD  [place] [address]  [phone]      Go to GI clinic tomorrow for EGD    Vipin Pacheco MD  [address] [address]  [phone]    Schedule an appointment as soon as possible for a visit in 1 week  make appointmet to follow up within 1 week       Requiring Further Evaluation/Follow Up POST HOSPITALIZATION/Incidental Findings:     Recommend further work-up of patient's sinus arrhythmia/PACs to determine cause. Follow-up with any GI recommendations as appropriate. Follow-up on BMP as appropriate. Diet: NPO after midnight. Full liquids prior to midnight    Activity: As tolerated    Instructions to Patient:     Take a GERD medications as prescribed    Nothing to eat or drink after midnight prior to EGD    Go to GI clinic for EGD tomorrow December 15. Stay hydrated    Obtain lab work on Friday to check kidney function          Discharge Medications:      Medication List      CONTINUE taking these medications    PriLOSEC 40 MG delayed release capsule  Generic drug: omeprazole        STOP taking these medications    aspirin 81 MG chewable tablet            Discharge Procedure Orders   Basic Metabolic Panel   Standing Status: Future Standing Exp. Date: 12/14/21   Order Comments: Please send results to patient's PCP       Time Spent on discharge is  25 mins in patient examination, evaluation, counseling as well as medication reconciliation, prescriptions for required medications, discharge plan and follow up. Electronically signed by   JORDYN Grey NP  12/14/2020  10:13 PM      Thank you Dr. Vipin Pacheco MD for the opportunity to be involved in this patient's care.

## 2020-12-15 NOTE — H&P
Pt Name: Stephanie Montelongo  MRN: [id number]  YOB: 1955  Date of evaluation: 12/15/2020    I have reviewed the patient's history and physical examination by Dr. Christian Arciniega yesterday 12/14/20 which meets criteria for H&P. Pt recently discharged from the hospital 12/12/20 through 12/14/20 for non-intractable vomiting with nausea. Pt presents today for scheduled EGD. No changes to history or on examination today, unless noted below. Negative covid-19 screening on 12/12/20. SAADIA ROMERO APRN-CNP  12/15/20  11:36 AM     GI CONSULTATION        REASON FOR CONSULT: Nausea, vomiting, odynophagia, weight loss     REQUESTING PHYSICIAN:  Geoff Beaver MD     HISTORY OF PRESENT ILLNESS:    The patient is a 60 y.o. male who presents with nausea, vomiting associated with odynophagia and weight loss. He has history of alcohol abuse and drinks about 4 drinks of christopher in a day. He has been having nausea, vomiting associated with some reflux for over 1-1/2 years. He takes omeprazole on as-needed basis and takes only about 5 to 6 pills in a month. He has symptoms of reflux and odynophagia up to 4 5 times in a week. He has lost over 30 pounds and the last 2 years. A CT scan here showed hiatal hernia as well as fatty liver disease. His AST was mildly elevated. He has family history of prostate cancer and some other cancer in mother but not colon cancer.  his last colonoscopy was about 2 years ago and had tubular adenoma.     Medical History:   Past Medical History        Past Medical History:   Diagnosis Date    Cardiac arrhythmia      Diverticulosis      Gastropathy      Nausea      Pulmonary nodule      Unintentional weight loss      Vomiting              SURGICAL HISTORY:  Past Surgical History         Past Surgical History:   Procedure Laterality Date    CYSTOSCOPY        UPPER GASTROINTESTINAL ENDOSCOPY                MEDS:  Home Medications           Prior to Admission medications Medication Sig Start Date End Date Taking? Authorizing Provider   omeprazole (PRILOSEC) 40 MG delayed release capsule Take 40 mg by mouth daily     Yes Historical Provider, MD   aspirin 81 MG chewable tablet Take 81 mg by mouth daily     Yes Historical Provider, MD         Scheduled Meds:  Scheduled Medications    [START ON 12/15/2020] polyethylene glycol  17 g Oral Daily    [Held by provider] sucralfate  1 g Oral 4 times per day    pantoprazole  40 mg Oral BID AC    aspirin  81 mg Oral Daily    sodium chloride flush  10 mL Intravenous 2 times per day    [Held by provider] heparin (porcine)  5,000 Units Subcutaneous 3 times per day    thiamine  100 mg Oral Daily    folic acid  1 mg Oral Daily         Continuous Infusions:  Infusions Meds    sodium chloride 100 mL/hr at 12/14/20 0323         PRN Meds:  PRN Medications   calcium carbonate, sodium chloride flush, potassium chloride **OR** potassium alternative oral replacement **OR** potassium chloride, magnesium sulfate, promethazine **OR** ondansetron, nicotine, acetaminophen **OR** acetaminophen, LORazepam **OR** LORazepam **OR** LORazepam **OR** LORazepam **OR** LORazepam **OR** LORazepam **OR** LORazepam **OR** LORazepam        No Known Allergies     SOCIAL HISTORY:   Social History               Socioeconomic History    Marital status:        Spouse name: Not on file    Number of children: Not on file    Years of education: Not on file    Highest education level: Not on file   Occupational History    Not on file   Social Needs    Financial resource strain: Not on file    Food insecurity       Worry: Not on file       Inability: Not on file    Transportation needs       Medical: Not on file       Non-medical: Not on file   Tobacco Use    Smoking status: Never Smoker    Smokeless tobacco: Never Used   Substance and Sexual Activity    Alcohol use:  Yes       Frequency: 4 or more times a week       Drinks per session: 3 or 4     Comment: dailr 2-3 glasses cognac/ bourbon    Drug use: Never    Sexual activity: Not on file   Lifestyle    Physical activity       Days per week: Not on file       Minutes per session: Not on file    Stress: Not on file   Relationships    Social connections       Talks on phone: Not on file       Gets together: Not on file       Attends Gnosticism service: Not on file       Active member of club or organization: Not on file       Attends meetings of clubs or organizations: Not on file       Relationship status: Not on file    Intimate partner violence       Fear of current or ex partner: Not on file       Emotionally abused: Not on file       Physically abused: Not on file       Forced sexual activity: Not on file   Other Topics Concern    Not on file   Social History Narrative    Not on file            FAMILY HISTORY:   Family History         Family History   Problem Relation Age of Onset    Cancer Mother      Cancer Father              REVIEW OF SYSTEMS:  10 out of 14 systems were reviewed and otherwise negative per patient recall.        PHYSICAL EXAM:    BP (!) 145/89   Pulse 70   Temp 98.5 °F (36.9 °C) (Oral)   Resp 18   Ht 5' 10.5\" (1.791 m)   Wt 169 lb 1.5 oz (76.7 kg)   SpO2 98%   BMI 23.92 kg/m²      General:  Alert and oriented x 3. No acute distress. Nontoxic in appearance.     Skin:    Rash is not appreciated. Easy bruising is not appreciated. There are no spider angioma on the anterior chest wall. There are no tattoos. There is no jaundice                   HEENT:  Sclera are anicteric and conjunctiva are normal.  Hearing is adequate. Oropharynx moist without thrush. Oral ulcers are not seen. Neck is supple without masses. There is no supraclavicular wasting     Heart:     Regular rate and rhythm. There are no murmurs. PMI is nondisplaced. Lungs:    Clear to auscultation, with no rales, wheezes, or rhonchi. Unlabored breathing pattern with adequate aeration.     Abdomen: Bowel sounds are normal.  The abdomen is soft and non distended. There is no tenderness, guarding, rebound, or rigidity. There are no masses, hepatosplenomegaly, or hernias. Peritoneal signs are not appreciated.     Extemities: There is no clubbing or edema. Pulses are palpable.      Lab and Imaging Review         Recent Labs     12/12/20  1435 12/13/20  0610 12/14/20  0643   WBC 9.5 4.7 4.5   HGB 12.6* 10.6* 11.1*   .9* 107.4* 108.1*    154 152   INR 0.9 0.9  --     137 138   K 4.5 3.8 3.6*   CL 96* 100 104   CO2 19* 30 27   BUN 21 16 7*   CREATININE 1.54* 1.37* 1.24*   GLUCOSE 85 121* 109*   CALCIUM 9.6 8.2* 8.3*   PROT 8.1  --   --    LABALBU 4.8  --   --    AST 99*  --   --    ALT 39  --   --    ALKPHOS 45  --   --    BILITOT 0.68  --   --    BILIDIR 0.28  --   --    AMYLASE 33  --   --    LIPASE 13  --   --    MG  --  1.5* 1.9           Recent Labs     12/12/20  1435 12/13/20  0610   INR 0.9 0.9   PROTIME 9.1* 9.9         IMPRESSION:  1.  Nausea with vomiting likely associated with reflux esophagitis  2. GERD likely associated with esophagitis  3. Weight loss with poor p.o. intake likely from odynophagia  4. Mildly elevation in AST and fatty liver on CT consistent with alcoholic hepatitis  5. Alcohol abuse     RECOMMENDATIONS:    I agree with PPI therapy twice daily. I counseled him extensively to quit alcohol use, which he states that he will try to do himself. We will schedule him for upper endoscopy tomorrow at [facility]. He can be discharged from GI standpoint today.   Discussed with the primary team.     Electronically signed by Bipin Klein MD  on 12/14/2020 at 4:28 PM

## 2020-12-15 NOTE — ANESTHESIA PRE PROCEDURE
Department of Anesthesiology  Preprocedure Note       Name:  Amilcar Deal   Age:  60 y.o.  :  1955                                          MRN:  [id number]         Date:  12/15/2020      Surgeon: Zi Greene):  Jorge Cool MD    Procedure: Procedure(s):  EGD ESOPHAGOGASTRODUODENOSCOPY    Medications prior to admission:   Prior to Admission medications    Medication Sig Start Date End Date Taking?  Authorizing Provider   omeprazole (PRILOSEC) 40 MG delayed release capsule Take 40 mg by mouth daily    Historical Provider, MD       Current medications:    Current Facility-Administered Medications   Medication Dose Route Frequency Provider Last Rate Last Admin    0.9 % sodium chloride infusion   Intravenous Once Jorge Cool MD           Allergies:  No Known Allergies    Problem List:    Patient Active Problem List   Diagnosis Code    BRYAN (acute kidney injury) ([facility] [address] N17.9    Atherosclerosis of aorta (HCC) I70.0    Anemia D64.9    Benign prostatic hyperplasia with lower urinary tract symptoms N40.1    Fatty liver K76.0    Hiatal hernia K44.9    Pulmonary nodules/lesions, multiple R91.8       Past Medical History:        Diagnosis Date    Cardiac arrhythmia     Diverticulosis     Gastropathy     Hyperlipidemia     Nausea     Pulmonary nodule     Unintentional weight loss     Vomiting        Past Surgical History:        Procedure Laterality Date    CYSTOSCOPY      PACEMAKER PLACEMENT      TONSILLECTOMY      UPPER GASTROINTESTINAL ENDOSCOPY         Social History:    Social History     Tobacco Use    Smoking status: Never Smoker    Smokeless tobacco: Never Used   Substance Use Topics    Alcohol use: Yes     Frequency: 4 or more times a week     Drinks per session: 3 or 4     Comment: [place] 2-3 glasses cognac/ bourbon                                Counseling given: Not Answered      Vital Signs (Current):   Vitals:    12/15/20 1109   BP: (!) 149/98   Pulse: 83   Resp: 20 Temp: 36.7 °C (98 °F)   TempSrc: Temporal   SpO2: 99%   Weight: 156 lb (70.8 kg)   Height: 5' 10\" (1.778 m)                                              BP Readings from Last 3 Encounters:   12/15/20 (!) 149/98   12/14/20 (!) 145/89       NPO Status: Time of last liquid consumption: 2300                        Time of last solid consumption: 2345                        Date of last liquid consumption: 12/14/20                        Date of last solid food consumption: 12/14/20    BMI:   Wt Readings from Last 3 Encounters:   12/15/20 156 lb (70.8 kg)   12/14/20 169 lb 1.5 oz (76.7 kg)     Body mass index is 22.38 kg/m². CBC:   Lab Results   Component Value Date    WBC 4.5 12/14/2020    RBC 3.03 12/14/2020    HGB 11.1 12/14/2020    HCT 32.8 12/14/2020    .1 12/14/2020    RDW 13.8 12/14/2020     12/14/2020       CMP:   Lab Results   Component Value Date     12/14/2020    K 3.6 12/14/2020     12/14/2020    CO2 27 12/14/2020    BUN 7 12/14/2020    CREATININE 1.24 12/14/2020    GFRAA >60 12/14/2020    LABGLOM 59 12/14/2020    GLUCOSE 109 12/14/2020    PROT 8.1 12/12/2020    CALCIUM 8.3 12/14/2020    BILITOT 0.68 12/12/2020    ALKPHOS 45 12/12/2020    AST 99 12/12/2020    ALT 39 12/12/2020       POC Tests: No results for input(s): POCGLU, POCNA, POCK, POCCL, POCBUN, POCHEMO, POCHCT in the last 72 hours.     Coags:   Lab Results   Component Value Date    PROTIME 9.9 12/13/2020    INR 0.9 12/13/2020    APTT <20.0 12/12/2020       HCG (If Applicable): No results found for: PREGTESTUR, PREGSERUM, HCG, HCGQUANT     ABGs: No results found for: PHART, PO2ART, KKW8LKV, BFP3FPK, BEART, Q5HRAYVD     Type & Screen (If Applicable):  No results found for: LABABO, LABRH    Drug/Infectious Status (If Applicable):  No results found for: HIV, HEPCAB    COVID-19 Screening (If Applicable):   Lab Results   Component Value Date    COVID19 Not Detected 12/12/2020         Anesthesia Evaluation Airway: Mallampati: II        Dental: normal exam         Pulmonary:normal exam                              ROS comment: Non productive cough   Cardiovascular:  Exercise tolerance: good (>4 METS),           Rhythm: regular  Rate: normal                    Neuro/Psych:   Negative Neuro/Psych ROS              GI/Hepatic/Renal:   (+) hiatal hernia, liver disease:,          ROS comment: Fatty liver, persistent vomiting. Endo/Other: Negative Endo/Other ROS                    Abdominal:           Vascular:                                        Anesthesia Plan      general and MAC     ASA 2       Induction: intravenous. Anesthetic plan and risks discussed with patient. Plan discussed with attending.                   Magen Bryan, APRN - CRNA   12/15/2020

## 2020-12-15 NOTE — OP NOTE
EGD      Patient:   Amilcar Deal    :    1955    Facility:   [address] [place] [address]   Referring/PCP: Dagmar Ricci MD    Procedure:   Esophagogastroduodenoscopy --diagnostic  Date:     12/15/2020   Endoscopist:  Jorge Cool MD, [facility]    Indication: Nausea, vomiting, odynophagia, weight loss    Postprocedure diagnosis:   Grade C reflux esophagitis with esophageal ulceration    Anesthesia:  MAC    Complications: None    EBL: None from the procedure    Specimen: none    Description of Procedure:  Informed consent was obtained from the patient after explanation of the procedure including indications, description of the procedure,  benefits and possible risks and complications of the procedure, and alternatives. Questions were answered. The patient's history was reviewed and a directed physical examination was performed prior to the procedure. Patient was monitored throughout the procedure with pulse oximetry and periodic assessment of vital signs. Patient was sedated as noted above. With the patient in the left lateral decubitus position, the Olympus videoendoscope was placed in the patient's mouth and under direct visualization passed into the esophagus. Visualization of the esophagus, stomach, and duodenum was performed during both introduction and withdrawal of the endoscope and retroflexed view of the proximal stomach was obtained. The scope was passed to the 2nd portion of the duodenum. The patient tolerated the procedure well and was taken to the recovery area in good condition. Findings[de-identified]   Esophagus: The esophagus had grade C reflux esophagitis in the lower esophagus with multiple clean-based esophageal ulcerations without any lydia esophageal mass. The rest of the esophagus was otherwise normal.  Stomach: The stomach had small sliding hiatal hernia.   The stomach was otherwise normal  Duodenum: normal Recommendations: PPI therapy twice daily for 2 months and then once daily  Patient was counseled to stop alcohol use  If symptoms persist, follow-up in GI office.     Electronically signed by Rodolfo Blum MD, FACG  on 12/15/2020 at 12:56 PM

## 2020-12-18 ENCOUNTER — HOSPITAL ENCOUNTER (OUTPATIENT)
Age: 65
Discharge: HOME | End: 2020-12-18
Payer: COMMERCIAL

## 2020-12-18 LAB
ANION GAP SERPL CALCULATED.3IONS-SCNC: 13 MMOL/L (ref 9–17)
BUN BLDV-MCNC: 7 MG/DL (ref 8–23)
BUN/CREAT BLD: ABNORMAL (ref 9–20)
CALCIUM SERPL-MCNC: 8.9 MG/DL (ref 8.6–10.4)
CHLORIDE BLD-SCNC: 106 MMOL/L (ref 98–107)
CO2: 22 MMOL/L (ref 20–31)
CREAT SERPL-MCNC: 1.32 MG/DL (ref 0.7–1.2)
GFR AFRICAN AMERICAN: >60 ML/MIN
GFR NON-AFRICAN AMERICAN: 55 ML/MIN
GFR SERPL CREATININE-BSD FRML MDRD: ABNORMAL ML/MIN/{1.73_M2}
GFR SERPL CREATININE-BSD FRML MDRD: ABNORMAL ML/MIN/{1.73_M2}
GLUCOSE BLD-MCNC: 99 MG/DL (ref 70–99)
POTASSIUM SERPL-SCNC: 3.8 MMOL/L (ref 3.7–5.3)
SODIUM BLD-SCNC: 141 MMOL/L (ref 135–144)

## 2020-12-18 PROCEDURE — 80048 BASIC METABOLIC PNL TOTAL CA: CPT

## 2020-12-18 PROCEDURE — 36415 COLL VENOUS BLD VENIPUNCTURE: CPT

## 2022-09-21 NOTE — PROGRESS NOTES
1151 returned post procedure skin warm color pink abdomen soft remains on monitors awake denies pain
DISCHARGE CRITERIA MET INSTRUCTIONS GIVEN  IV  DISCONTINUED I SITE CLEAR DENIES PAIN ABDOMEN SOFT
Xray Chest 1 View- PORTABLE-Urgent

## 2023-07-31 ENCOUNTER — OFFICE VISIT (OUTPATIENT)
Dept: PODIATRY | Age: 68
End: 2023-07-31
Payer: MEDICARE

## 2023-07-31 VITALS — WEIGHT: 149 LBS | HEIGHT: 70 IN | BODY MASS INDEX: 21.33 KG/M2

## 2023-07-31 DIAGNOSIS — M72.2 PLANTAR FASCIITIS, BILATERAL: ICD-10-CM

## 2023-07-31 DIAGNOSIS — M79.2 NEURITIS: Primary | ICD-10-CM

## 2023-07-31 PROCEDURE — 1123F ACP DISCUSS/DSCN MKR DOCD: CPT | Performed by: PODIATRIST

## 2023-07-31 PROCEDURE — G8427 DOCREV CUR MEDS BY ELIG CLIN: HCPCS | Performed by: PODIATRIST

## 2023-07-31 PROCEDURE — G8420 CALC BMI NORM PARAMETERS: HCPCS | Performed by: PODIATRIST

## 2023-07-31 PROCEDURE — 99203 OFFICE O/P NEW LOW 30 MIN: CPT | Performed by: PODIATRIST

## 2023-07-31 PROCEDURE — 4004F PT TOBACCO SCREEN RCVD TLK: CPT | Performed by: PODIATRIST

## 2023-07-31 PROCEDURE — 3017F COLORECTAL CA SCREEN DOC REV: CPT | Performed by: PODIATRIST

## 2023-07-31 NOTE — PROGRESS NOTES
[address] [facility],[address] PODIATRY [address] [address]  Dept: [phone]  Dept Fax: [phone]    NEW PATIENT PROGRESS NOTE  Date of patient's visit: 7/31/2023  Patient's Name:  Parris Osgood YOB: 1955            Patient Care Team:  Danielle Golden MD as PCP - General (Family Medicine)  Masoud Martin DPM as Physician (Podiatry)        Chief Complaint   Patient presents with    New Patient     To establish care    Foot Pain     Bilateral feet x2 years         HPI:   Parris Osgood is a 78 y.o. male who presents to the office today complaining of bilateral foot pain. Symptoms began 2 year(s) ago. Patient relates pain is Present to mikayla heel. Pain is rated 1 out of 10 and is described as intermittent, mild, moderate, severe. Treatments prior to today's visit include: none. Currently denies F/C/N/V. Pt's primary care physician is Danielle Golden MD last seen 11/13/2018. No Known Allergies    Past Medical History:   Diagnosis Date    Cardiac arrhythmia     Diverticulosis     Gastropathy     History of colon polyps     Hyperlipidemia     Nausea     Pulmonary nodule     Unintentional weight loss     Vomiting        Prior to Admission medications    Medication Sig Start Date End Date Taking?  Authorizing Provider   omeprazole (PRILOSEC) 40 MG delayed release capsule Take 1 capsule by mouth daily   Yes Historical Provider, MD       Past Surgical History:   Procedure Laterality Date    COLONOSCOPY      CYSTOSCOPY      PACEMAKER PLACEMENT      TONSILLECTOMY      UPPER GASTROINTESTINAL ENDOSCOPY      UPPER GASTROINTESTINAL ENDOSCOPY N/A 12/15/2020    EGD ESOPHAGOGASTRODUODENOSCOPY performed by Nikky Montiel MD at [facility]       Family History   Problem Relation Age of Onset    Cancer Mother     Cancer Father        Social History     Tobacco Use    Smoking status: Never    Smokeless tobacco: Never

## 2023-10-13 ENCOUNTER — HOSPITAL ENCOUNTER (EMERGENCY)
Age: 68
Discharge: HOME | End: 2023-10-13
Attending: STUDENT IN AN ORGANIZED HEALTH CARE EDUCATION/TRAINING PROGRAM
Payer: MEDICARE

## 2023-10-13 VITALS
DIASTOLIC BLOOD PRESSURE: 95 MMHG | SYSTOLIC BLOOD PRESSURE: 153 MMHG | BODY MASS INDEX: 24.41 KG/M2 | OXYGEN SATURATION: 95 % | RESPIRATION RATE: 20 BRPM | TEMPERATURE: 98.2 F | HEART RATE: 93 BPM | WEIGHT: 164.8 LBS | HEIGHT: 69 IN

## 2023-10-13 DIAGNOSIS — M79.89 LEG SWELLING: Primary | ICD-10-CM

## 2023-10-13 PROCEDURE — 6360000002 HC RX W HCPCS

## 2023-10-13 PROCEDURE — 96372 THER/PROPH/DIAG INJ SC/IM: CPT

## 2023-10-13 PROCEDURE — 99284 EMERGENCY DEPT VISIT MOD MDM: CPT

## 2023-10-13 RX ORDER — ENOXAPARIN SODIUM 100 MG/ML
1 INJECTION SUBCUTANEOUS ONCE
Status: COMPLETED | OUTPATIENT
Start: 2023-10-13 | End: 2023-10-13

## 2023-10-13 RX ADMIN — ENOXAPARIN SODIUM 70 MG: 100 INJECTION SUBCUTANEOUS at 20:41

## 2023-10-13 ASSESSMENT — ENCOUNTER SYMPTOMS
COUGH: 0
SHORTNESS OF BREATH: 0
ABDOMINAL PAIN: 0
NAUSEA: 0
CONSTIPATION: 0
CHEST TIGHTNESS: 0
DIARRHEA: 0
VOMITING: 0

## 2023-10-13 NOTE — ED PROVIDER NOTES
[address] [facility] ED  eMERGENCY dEPARTMENT eNCOUnter      Pt Name: Tiffani Crane  MRN: [id number]  [address] [facility] 1955  Date of evaluation: 10/13/2023  Provider: JORDYN Cano [facility]       Chief Complaint   Patient presents with    Leg Swelling     Right leg sent from urgent care         HISTORY OF PRESENT ILLNESS  (Location/Symptom, Timing/Onset, Context/Setting, Quality, Duration, Modifying Factors, Severity.)   Tiffani Crane is a 78 y.o. male who presents to the emergency department from urgent care to be evaluated for the swelling in his right ankle and calf. Patient states the swelling began in his ankle 2 days ago, now radiating up to his right calf. He denies any injury or trauma to the ankle, no previous injuries or surgeries. Patient does follow with podiatry for neuritis and plantar fasciitis. He denies any numbness or tingling to the extremity. He denies any fever chills, chest pain, shortness of breath, nausea or vomiting. Patient rates the discomfort to his ankle a 1 out of 10. No history of blood clots, non-smoker, no recent travel, no history of cancer, no hormone use. Nursing Notes were reviewed. ALLERGIES     Patient has no known allergies.     CURRENT MEDICATIONS       Previous Medications    DICLOFENAC (VOLTAREN) 50 MG EC TABLET    Take 1 tablet by mouth 3 times daily (with meals)    OMEPRAZOLE (PRILOSEC) 40 MG DELAYED RELEASE CAPSULE    Take 1 capsule by mouth daily       PAST MEDICAL HISTORY         Diagnosis Date    Cardiac arrhythmia     Diverticulosis     Gastropathy     History of colon polyps     Hyperlipidemia     Nausea     Pulmonary nodule     Unintentional weight loss     Vomiting        SURGICAL HISTORY           Procedure Laterality Date    COLONOSCOPY      CYSTOSCOPY      PACEMAKER PLACEMENT      TONSILLECTOMY      UPPER GASTROINTESTINAL ENDOSCOPY      UPPER GASTROINTESTINAL ENDOSCOPY N/A 12/15/2020    EGD

## 2023-10-14 ENCOUNTER — HOSPITAL ENCOUNTER (OUTPATIENT)
Dept: VASCULAR LAB | Age: 68
End: 2023-10-14
Payer: MEDICARE

## 2023-10-14 DIAGNOSIS — M79.89 LEG SWELLING: ICD-10-CM

## 2023-10-14 PROCEDURE — 93971 EXTREMITY STUDY: CPT

## 2023-10-14 PROCEDURE — 93971 EXTREMITY STUDY: CPT | Performed by: SURGERY

## 2023-10-14 NOTE — DISCHARGE INSTRUCTIONS
Please return to the hospital at 7 AM with the order for the ultrasound of your right leg. This is to evaluate for a blood clot, as discussed you were given an injection in the last 24 hours and if your ultrasound is positive for blood clot you will need to be started on a blood thinning medication in the emergency department tomorrow. Please follow-up with your primary care physician about your visit today. PLEASE RETURN TO THE EMERGENCY DEPARTMENT IMMEDIATELY if your symptoms worsen in anyway or in 8-12 hours if not improved for re-evaluation. You should immediately return to the ER for symptoms such as increasing pain, bloody stool, fever, a feeling of passing out, light headed, dizziness, chest pain, shortness of breath, persistent nausea and/or vomiting, numbness or weakness to the arms or legs, coolness or color change of the arms or legs. Take your medication as indicated and prescribed. If you are given an antibiotic then, make sure you get the prescription filled and take the antibiotics until finished. [facility]!!! On behalf of the Emergency Department staff at [facility]), I would like to thank you for giving us the opportunity to address your health care needs and concerns. We hope that during your visit, our service was delivered in a professional and caring manner. Please keep [facility]) in mind as we walk with you down the path to your own personal wellness. Please expect an automated text message or email from us so we can ask a few questions about your health and progress. Based on your answers, a clinician may call you back to offer help and instructions. Please understand that early in the process of an illness or injury, an emergency department workup can be falsely reassuring. If you notice any worsening, changing or persistent symptoms please call your family doctor or return to the ER immediately.

## 2023-10-14 NOTE — ED PROVIDER NOTES
eMERGENCY dEPARTMENT eNCOUnter   Independent Attestation     Pt Name: Washington Reyes  MRN: [id number]  [address] 1955  Date of evaluation: 10/13/23     Washington Reyes is a 78 y.o. male with CC: Leg Swelling (Right leg sent from urgent care)    Concern for DVT. Offer anticoagulation ultrasound or attempt D-dimer to rule out. This visit was performed by both a physician and an APC. I performed all aspects of the MDM as documented.       Phuc Mccarthy DO  Attending Emergency Physician         Phuc Mccarthy DO  10/13/23 2011

## 2024-01-16 ENCOUNTER — OFFICE VISIT (OUTPATIENT)
Dept: NEUROLOGY | Age: 69
End: 2024-01-16
Payer: MEDICARE

## 2024-01-16 VITALS
HEART RATE: 83 BPM | WEIGHT: 154 LBS | DIASTOLIC BLOOD PRESSURE: 85 MMHG | BODY MASS INDEX: 22.05 KG/M2 | SYSTOLIC BLOOD PRESSURE: 139 MMHG | HEIGHT: 70 IN

## 2024-01-16 DIAGNOSIS — R20.0 NUMBNESS AND TINGLING OF BOTH LEGS: ICD-10-CM

## 2024-01-16 DIAGNOSIS — D50.9 IRON DEFICIENCY ANEMIA, UNSPECIFIED IRON DEFICIENCY ANEMIA TYPE: ICD-10-CM

## 2024-01-16 DIAGNOSIS — F10.20 CHRONIC ALCOHOLISM (HCC): ICD-10-CM

## 2024-01-16 DIAGNOSIS — R29.818 DIFFICULTY BALANCING: ICD-10-CM

## 2024-01-16 DIAGNOSIS — E53.9 VITAMIN B DEFICIENCY: ICD-10-CM

## 2024-01-16 DIAGNOSIS — R20.2 NUMBNESS AND TINGLING OF BOTH LEGS: ICD-10-CM

## 2024-01-16 DIAGNOSIS — G62.9 NEUROPATHY: ICD-10-CM

## 2024-01-16 DIAGNOSIS — R20.2 PINS AND NEEDLES SENSATION: Primary | ICD-10-CM

## 2024-01-16 DIAGNOSIS — E55.9 VITAMIN D DEFICIENCY: ICD-10-CM

## 2024-01-16 DIAGNOSIS — R25.1 TREMOR OF RIGHT HAND: ICD-10-CM

## 2024-01-16 PROCEDURE — 1123F ACP DISCUSS/DSCN MKR DOCD: CPT | Performed by: PSYCHIATRY & NEUROLOGY

## 2024-01-16 PROCEDURE — 99204 OFFICE O/P NEW MOD 45 MIN: CPT | Performed by: PSYCHIATRY & NEUROLOGY

## 2024-01-16 RX ORDER — MAGNESIUM OXIDE 400 MG/1
1 TABLET ORAL EVERY MORNING
COMMUNITY
Start: 2023-10-19

## 2024-01-16 RX ORDER — PROPRANOLOL HYDROCHLORIDE 20 MG/1
20 TABLET ORAL 2 TIMES DAILY
COMMUNITY
Start: 2023-12-26

## 2024-01-16 RX ORDER — GABAPENTIN 100 MG/1
CAPSULE ORAL
Qty: 60 CAPSULE | Refills: 3 | Status: SHIPPED | OUTPATIENT
Start: 2024-01-16 | End: 2024-06-24

## 2024-01-16 RX ORDER — ACETAMINOPHEN 500 MG
500 TABLET ORAL EVERY 6 HOURS PRN
COMMUNITY

## 2024-01-16 RX ORDER — CHOLECALCIFEROL (VITAMIN D3) 1250 MCG
CAPSULE ORAL
COMMUNITY

## 2024-01-16 RX ORDER — ALBUTEROL SULFATE 90 UG/1
AEROSOL, METERED RESPIRATORY (INHALATION) PRN
COMMUNITY
Start: 2023-11-28

## 2024-01-16 RX ORDER — LANOLIN ALCOHOL/MO/W.PET/CERES
100 CREAM (GRAM) TOPICAL DAILY
COMMUNITY
Start: 2023-10-19

## 2024-01-16 NOTE — PROGRESS NOTES
NEUROLOGY CONSULT  Patient Name:       Ravinder Best  :        1955  Clinic Visit Date:    2024        Dear Evita Hendricks, APRN - CNP     I had the opportunity to see your patient, Mr. Ravinder Best in neurology consultation today. As you know he  is a  68 y.o.  male with c/o numbness and tingling in bilateral lower extremities and also involving fingers of both hands for the past 1 year.  Admits to intermittent \"pins and needle sensations in both feet and lower legs\" with nocturnal variation with aggravation of painful sensations in the evenings and nighttime.  Also has restless leg syndrome symptoms affecting sleep as well.  Also has gait difficulties and balance difficulties and occasional falls.  Denied head injuries and loss of consciousness.  Never had any jerking activity or tongue bite/bladder incontinence with syncopal episodes.  Upon further questioning; admits to daily alcohol use.  He is trying to cut down alcohol use.  He drinks about 4 drinks of christopher every day \"since age 21s\" for about \"greater than 40 years\".  He is trying to cut down the alcohol use.  He is presently drinking only 2 shots on a weekday and \"slightly more on weekends\".    He also stated that he has been having tremor in right hand digits occurring intermittently and predominantly with resting state.  Denied any aggravation of tremors with exertion.  He also noticed that he has occasional tremors in right lower extremity as well.  Admits to having aggravation of tremors with stress.  These tremors are occurring for the past 1 year and they are not affecting his daily routine.  Never tried any medications to help for these tremors.  Wants to try meds to help for these new onset tremors.  Denied family history of tremors.    All items selected indicate a positive finding.    Those items not selected are negative.  Constitutional [] Weight loss/gain   [] Fatigue  [] Fever/Chills   HEENT [] Hearing Loss  []

## 2024-01-18 ENCOUNTER — TELEPHONE (OUTPATIENT)
Dept: NEUROLOGY | Age: 69
End: 2024-01-18

## 2024-01-18 NOTE — TELEPHONE ENCOUNTER
The Ferritin order printed after the patient left the office on the 16th. I tried multiple times but was unable to reach the patient by phone.  I mailed the order to his home. [place]

## 2024-04-03 ENCOUNTER — HOSPITAL ENCOUNTER (OUTPATIENT)
Dept: NEUROLOGY | Age: 69
Discharge: HOME | End: 2024-04-03
Payer: MEDICARE

## 2024-04-03 DIAGNOSIS — R20.2 PINS AND NEEDLES SENSATION: ICD-10-CM

## 2024-04-03 DIAGNOSIS — R20.2 NUMBNESS AND TINGLING OF BOTH LEGS: ICD-10-CM

## 2024-04-03 DIAGNOSIS — R20.0 NUMBNESS AND TINGLING OF BOTH LEGS: ICD-10-CM

## 2024-04-03 LAB
FERRITIN: 347 NG/ML
T PALLIDUM AB: NORMAL

## 2024-04-03 PROCEDURE — 95886 MUSC TEST DONE W/N TEST COMP: CPT | Performed by: PHYSICAL MEDICINE & REHABILITATION

## 2024-04-03 PROCEDURE — 95909 NRV CNDJ TST 5-6 STUDIES: CPT | Performed by: PHYSICAL MEDICINE & REHABILITATION

## 2024-04-09 ENCOUNTER — OFFICE VISIT (OUTPATIENT)
Dept: NEUROLOGY | Age: 69
End: 2024-04-09
Payer: MEDICARE

## 2024-04-09 VITALS
HEART RATE: 94 BPM | HEIGHT: 70 IN | SYSTOLIC BLOOD PRESSURE: 129 MMHG | DIASTOLIC BLOOD PRESSURE: 87 MMHG | BODY MASS INDEX: 21.7 KG/M2 | WEIGHT: 151.6 LBS

## 2024-04-09 DIAGNOSIS — M62.838 MUSCLE SPASM OF BOTH LOWER LEGS: ICD-10-CM

## 2024-04-09 DIAGNOSIS — R20.2 NUMBNESS AND TINGLING OF BOTH LEGS: Primary | ICD-10-CM

## 2024-04-09 DIAGNOSIS — E53.1 VITAMIN B6 DEFICIENCY: ICD-10-CM

## 2024-04-09 DIAGNOSIS — R25.1 TREMOR OF RIGHT HAND: ICD-10-CM

## 2024-04-09 DIAGNOSIS — R20.0 NUMBNESS AND TINGLING OF BOTH LEGS: Primary | ICD-10-CM

## 2024-04-09 PROCEDURE — 1123F ACP DISCUSS/DSCN MKR DOCD: CPT | Performed by: PSYCHIATRY & NEUROLOGY

## 2024-04-09 PROCEDURE — 99214 OFFICE O/P EST MOD 30 MIN: CPT | Performed by: PSYCHIATRY & NEUROLOGY

## 2024-04-09 RX ORDER — BACLOFEN 10 MG/1
5 TABLET ORAL NIGHTLY PRN
Qty: 15 TABLET | Refills: 1 | Status: SHIPPED | OUTPATIENT
Start: 2024-04-09

## 2024-04-09 NOTE — PROGRESS NOTES
NEUROLOGY FOLLOW-UP VISIT   Patient Name:       Ravinder Best  :        1955  Clinic Visit Date:    2024  LOV: 2024      Dear Evita Hendricks, APRN - CNP     I saw Mr. Ravinder Best in follow-up visit today in continuation of neurologic care.    As you know he  is a  68 y.o.  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  He has been on Sinemet 25/100 tab, full tab bid with gradual dose escalation.    Today is the first follow-up visit.  Since initial visit his tremors continued.  He comes to clinic stating that he is presently taking sinemet tab tid and he is getting only marginal relief.  He denies any medication related anticipated adverse effects.  He is wondering about next therapeutic approach.        On initial visit on 2024:  Patient presented with c/o numbness and tingling in bilateral lower extremities and also involving fingers of both hands for the past 1 year.  Admits to intermittent \"pins and needle sensations in both feet and lower legs\" with nocturnal variation with aggravation of painful sensations in the evenings and nighttime.  Also has restless leg syndrome symptoms affecting sleep as well.  Also has gait difficulties and balance difficulties and occasional falls.  Denied head injuries and loss of consciousness.  Never had any jerking activity or tongue bite/bladder incontinence with syncopal episodes.  Upon further questioning; admits to daily alcohol use.  He is trying to cut down alcohol use.  He drinks about 4 drinks of christopher every day \"since age 21s\" for about \"greater than 40 years\".  He is trying to cut down the alcohol use.  He is presently drinking only 2 shots on a weekday and \"slightly more on weekends\".    He also stated that he has been having tremor in right hand digits

## 2024-04-10 DIAGNOSIS — E53.1 VITAMIN B6 DEFICIENCY: ICD-10-CM

## 2024-04-11 DIAGNOSIS — D50.9 IRON DEFICIENCY ANEMIA, UNSPECIFIED IRON DEFICIENCY ANEMIA TYPE: ICD-10-CM

## 2024-05-04 ENCOUNTER — TELEPHONE (OUTPATIENT)
Dept: NEUROLOGY | Age: 69
End: 2024-05-04

## 2024-05-04 NOTE — TELEPHONE ENCOUNTER
05 04 2024 called patient times 2 (04 20 2024 and 04 27 2024 at ) to schedule July of 2024 follow up appointment with Dr Regan, no answer both times, no response.  I mailed the patient a letter asking them to call the office back to schedule this appointment.  KS

## 2024-09-23 ENCOUNTER — TRANSCRIBE ORDERS (OUTPATIENT)
Dept: ADMINISTRATIVE | Age: 69
End: 2024-09-23

## 2024-09-23 DIAGNOSIS — R35.1 NOCTURIA: Primary | ICD-10-CM

## 2024-11-29 ENCOUNTER — HOSPITAL ENCOUNTER (OUTPATIENT)
Dept: ULTRASOUND IMAGING | Age: 69
Discharge: HOME | End: 2024-12-01
Payer: MEDICARE

## 2024-11-29 DIAGNOSIS — R35.1 NOCTURIA: ICD-10-CM

## 2024-11-29 PROCEDURE — 76775 US EXAM ABDO BACK WALL LIM: CPT | Performed by: NURSE PRACTITIONER

## 2025-04-08 ENCOUNTER — HOSPITAL ENCOUNTER (OUTPATIENT)
Age: 70
Setting detail: OUTPATIENT SURGERY
Discharge: HOME | End: 2025-04-08
Attending: INTERNAL MEDICINE | Admitting: INTERNAL MEDICINE
Payer: MEDICARE

## 2025-04-08 VITALS
RESPIRATION RATE: 14 BRPM | HEIGHT: 72 IN | DIASTOLIC BLOOD PRESSURE: 80 MMHG | BODY MASS INDEX: 20.18 KG/M2 | HEART RATE: 95 BPM | WEIGHT: 149 LBS | TEMPERATURE: 98.2 F | OXYGEN SATURATION: 100 % | SYSTOLIC BLOOD PRESSURE: 130 MMHG

## 2025-04-08 DIAGNOSIS — R94.39 ABNORMAL STRESS TEST: ICD-10-CM

## 2025-04-08 LAB
BUN BLD-MCNC: 13 MG/DL (ref 8–26)
CHLORIDE BLD-SCNC: 109 MMOL/L (ref 98–107)
ECHO BSA: 1.85 M2
EGFR, POC: 82 ML/MIN/1.73M2
GLUCOSE BLD-MCNC: 87 MG/DL (ref 74–100)
HCT VFR BLD AUTO: 40 % (ref 41–53)
PLATELET # BLD AUTO: 217 K/UL (ref 138–453)
POC CREATININE: 1 MG/DL (ref 0.51–1.19)
POC HEMOGLOBIN (CALC): 13.5 G/DL (ref 13.5–17.5)
POTASSIUM BLD-SCNC: 5.5 MMOL/L (ref 3.5–4.5)
POTASSIUM SERPL-SCNC: 4 MMOL/L (ref 3.7–5.3)
SODIUM BLD-SCNC: 142 MMOL/L (ref 138–146)

## 2025-04-08 PROCEDURE — 6360000004 HC RX CONTRAST MEDICATION: Performed by: INTERNAL MEDICINE

## 2025-04-08 PROCEDURE — 2580000003 HC RX 258: Performed by: INTERNAL MEDICINE

## 2025-04-08 PROCEDURE — 85049 AUTOMATED PLATELET COUNT: CPT

## 2025-04-08 PROCEDURE — 7100000011 HC PHASE II RECOVERY - ADDTL 15 MIN: Performed by: INTERNAL MEDICINE

## 2025-04-08 PROCEDURE — C1892 INTRO/SHEATH,FIXED,PEEL-AWAY: HCPCS | Performed by: INTERNAL MEDICINE

## 2025-04-08 PROCEDURE — 99152 MOD SED SAME PHYS/QHP 5/>YRS: CPT | Performed by: INTERNAL MEDICINE

## 2025-04-08 PROCEDURE — 84520 ASSAY OF UREA NITROGEN: CPT

## 2025-04-08 PROCEDURE — 82565 ASSAY OF CREATININE: CPT

## 2025-04-08 PROCEDURE — 2709999900 HC NON-CHARGEABLE SUPPLY: Performed by: INTERNAL MEDICINE

## 2025-04-08 PROCEDURE — 7100000010 HC PHASE II RECOVERY - FIRST 15 MIN: Performed by: INTERNAL MEDICINE

## 2025-04-08 PROCEDURE — 84132 ASSAY OF SERUM POTASSIUM: CPT

## 2025-04-08 PROCEDURE — 82435 ASSAY OF BLOOD CHLORIDE: CPT

## 2025-04-08 PROCEDURE — 82947 ASSAY GLUCOSE BLOOD QUANT: CPT

## 2025-04-08 PROCEDURE — 93454 CORONARY ARTERY ANGIO S&I: CPT | Performed by: INTERNAL MEDICINE

## 2025-04-08 PROCEDURE — 85014 HEMATOCRIT: CPT

## 2025-04-08 PROCEDURE — 6360000002 HC RX W HCPCS: Performed by: INTERNAL MEDICINE

## 2025-04-08 PROCEDURE — 6370000000 HC RX 637 (ALT 250 FOR IP): Performed by: INTERNAL MEDICINE

## 2025-04-08 PROCEDURE — 93458 L HRT ARTERY/VENTRICLE ANGIO: CPT | Performed by: INTERNAL MEDICINE

## 2025-04-08 PROCEDURE — 99151 MOD SED SAME PHYS/QHP <5 YRS: CPT | Performed by: INTERNAL MEDICINE

## 2025-04-08 PROCEDURE — C1894 INTRO/SHEATH, NON-LASER: HCPCS | Performed by: INTERNAL MEDICINE

## 2025-04-08 PROCEDURE — 76937 US GUIDE VASCULAR ACCESS: CPT | Performed by: INTERNAL MEDICINE

## 2025-04-08 PROCEDURE — 84295 ASSAY OF SERUM SODIUM: CPT

## 2025-04-08 PROCEDURE — C1769 GUIDE WIRE: HCPCS | Performed by: INTERNAL MEDICINE

## 2025-04-08 PROCEDURE — C1760 CLOSURE DEV, VASC: HCPCS | Performed by: INTERNAL MEDICINE

## 2025-04-08 DEVICE — ANGIO-SEAL VIP VASCULAR CLOSURE DEVICE
Type: IMPLANTABLE DEVICE | Status: FUNCTIONAL
Brand: ANGIO-SEAL

## 2025-04-08 RX ORDER — SODIUM CHLORIDE 9 MG/ML
INJECTION, SOLUTION INTRAVENOUS CONTINUOUS
Status: DISCONTINUED | OUTPATIENT
Start: 2025-04-08 | End: 2025-04-08 | Stop reason: HOSPADM

## 2025-04-08 RX ORDER — ASPIRIN 325 MG
325 TABLET ORAL DAILY
Status: DISCONTINUED | OUTPATIENT
Start: 2025-04-08 | End: 2025-04-08 | Stop reason: HOSPADM

## 2025-04-08 RX ORDER — FENTANYL CITRATE 50 UG/ML
INJECTION, SOLUTION INTRAMUSCULAR; INTRAVENOUS PRN
Status: DISCONTINUED | OUTPATIENT
Start: 2025-04-08 | End: 2025-04-08 | Stop reason: HOSPADM

## 2025-04-08 RX ORDER — IOPAMIDOL 755 MG/ML
INJECTION, SOLUTION INTRAVASCULAR PRN
Status: DISCONTINUED | OUTPATIENT
Start: 2025-04-08 | End: 2025-04-08 | Stop reason: HOSPADM

## 2025-04-08 RX ORDER — MIDAZOLAM HYDROCHLORIDE 1 MG/ML
INJECTION, SOLUTION INTRAMUSCULAR; INTRAVENOUS PRN
Status: DISCONTINUED | OUTPATIENT
Start: 2025-04-08 | End: 2025-04-08 | Stop reason: HOSPADM

## 2025-04-08 RX ADMIN — ASPIRIN 325 MG: 325 TABLET ORAL at 13:30

## 2025-04-08 RX ADMIN — SODIUM CHLORIDE: 0.9 INJECTION, SOLUTION INTRAVENOUS at 13:20

## 2025-04-08 NOTE — PROGRESS NOTES
Received post procedure to [facility] to room P[facility]14. Assessment obtained. Restrictions reviewed with patient. Post procedure pathway initiated.  Right groin site soft, dressing dry and intact.  No hematoma noted.  Family at side.  Patient without complaints. Head of bed flat with right leg straight.

## 2025-04-08 NOTE — PROGRESS NOTES
All discharge instructions explained to patient and wife. Discharged ambulatory with all belongings.

## 2025-04-08 NOTE — H&P
[facility]  Procedure History and Physical Update          Patient Name: Ravinder Best Jr.  MRN:    [id number]  YOB: 1955  Date of evaluation:  4/8/2025    Procedure:    Cardiac cath +/- PCI    Indication for procedure:  Abnormal stress test      Please refer to the office note completed by Dr. Mitchell on 3/5/35 in the medical record and note that:    [x] I have examined the patient and reviewed the H&P/Consult and there are no changes to be made to the assessment or plan.    [] I have examined the patient and reviewed the H&P/Consult and have noted the following changes:    Past Medical History:   Diagnosis Date    Cardiac arrhythmia     Diverticulosis     Gastropathy     History of colon polyps     Hyperlipidemia     Nausea     Pulmonary nodule     Unintentional weight loss     Vomiting        Past Surgical History:   Procedure Laterality Date    COLONOSCOPY      CYSTOSCOPY      PACEMAKER PLACEMENT      TONSILLECTOMY      UPPER GASTROINTESTINAL ENDOSCOPY      UPPER GASTROINTESTINAL ENDOSCOPY N/A 12/15/2020    EGD ESOPHAGOGASTRODUODENOSCOPY performed by Phil Washington MD at [facility]       Family History   Problem Relation Age of Onset    Cancer Mother     Cancer Father     Alzheimer's Disease Maternal Grandfather        No Known Allergies    Prior to Admission medications    Medication Sig Start Date End Date Taking? Authorizing Provider   vitamin D (CHOLECALCIFEROL) 50 MCG (2000 UT) CAPS capsule Take 1 capsule by mouth 1/15/25  Yes ProviderJason MD   dapagliflozin (FARXIGA) 10 MG tablet Take 1 tablet by mouth 10/29/24  Yes ProviderJason MD   metoprolol succinate (TOPROL XL) 25 MG extended release tablet Take 1 tablet by mouth daily 10/28/24  Yes ProviderJason MD   Multiple Vitamin (MULTI-VITAMIN) TABS Take 1 tablet by mouth 1/15/25  Yes ProviderJason MD   potassium chloride (KLOR-CON M) 20 MEQ extended release tablet Take 1 tablet by

## 2025-04-08 NOTE — DISCHARGE INSTRUCTIONS
doctor).  \" If you feel nauseated, continue with liquids until the nausea is gone.  \" Notify your physician if you have not urinated within 8 hours after the procedure.  \" Resume your medications unless otherwise instructed

## 2025-06-12 ENCOUNTER — TRANSCRIBE ORDERS (OUTPATIENT)
Dept: ADMISSION | Age: 70
End: 2025-06-12

## 2025-06-12 ENCOUNTER — HOSPITAL ENCOUNTER (OUTPATIENT)
Dept: GENERAL RADIOLOGY | Age: 70
Discharge: HOME | End: 2025-06-14
Payer: MEDICARE

## 2025-06-12 DIAGNOSIS — M79.89 FOOT SWELLING: Primary | ICD-10-CM

## 2025-06-12 DIAGNOSIS — M79.89 FOOT SWELLING: ICD-10-CM

## 2025-06-12 PROCEDURE — 73630 X-RAY EXAM OF FOOT: CPT

## 2025-08-12 ENCOUNTER — TRANSCRIBE ORDERS (OUTPATIENT)
Dept: ADMINISTRATIVE | Age: 70
End: 2025-08-12

## 2025-08-12 DIAGNOSIS — R74.8 ELEVATED LIVER ENZYMES: Primary | ICD-10-CM

## 2025-09-04 ENCOUNTER — HOSPITAL ENCOUNTER (OUTPATIENT)
Dept: ULTRASOUND IMAGING | Age: 70
Discharge: HOME | End: 2025-09-06
Payer: MEDICARE

## 2025-09-04 DIAGNOSIS — R74.8 ELEVATED LIVER ENZYMES: ICD-10-CM

## 2025-09-04 PROCEDURE — 76705 ECHO EXAM OF ABDOMEN: CPT

## 2025-09-04 PROCEDURE — 76981 USE PARENCHYMA: CPT

## (undated) DEVICE — TRAY SURG CUST CRD CATH TOLEDO

## (undated) DEVICE — GUIDEWIRE 35/260/FC/PTFE/3J: Brand: GUIDEWIRE

## (undated) DEVICE — INTRODUCER SHTH 6FR L11CM 0.038IN STD SIDEPRT EXTN 3 W

## (undated) DEVICE — ANGIOGRAPHIC CATHETER: Brand: EXPO™

## (undated) DEVICE — KIT MICRO INTRO 4FR STIFF 40CM NIGHTENALL TUNGSTEN 7SMT

## (undated) DEVICE — CATHETER ANGIO 6FR L100CM DIA0.056IN FR4 CRV VASC ACCS EXPO